# Patient Record
Sex: FEMALE | Race: BLACK OR AFRICAN AMERICAN | NOT HISPANIC OR LATINO | Employment: UNEMPLOYED | URBAN - METROPOLITAN AREA
[De-identification: names, ages, dates, MRNs, and addresses within clinical notes are randomized per-mention and may not be internally consistent; named-entity substitution may affect disease eponyms.]

---

## 2017-01-24 RX ORDER — ROSUVASTATIN CALCIUM 40 MG/1
TABLET, FILM COATED ORAL
Qty: 90 TABLET | Refills: 0 | Status: SHIPPED | OUTPATIENT
Start: 2017-01-24 | End: 2017-05-24 | Stop reason: SDUPTHER

## 2017-01-25 ENCOUNTER — TELEPHONE (OUTPATIENT)
Dept: INTERNAL MEDICINE | Facility: CLINIC | Age: 57
End: 2017-01-25

## 2017-01-25 DIAGNOSIS — Z00.00 ANNUAL PHYSICAL EXAM: Primary | ICD-10-CM

## 2017-01-25 NOTE — TELEPHONE ENCOUNTER
Spoke with Ms Jes in regards to appointment today informing it's not time for her Annual to isn't due until may . Asked if she wanted to reschedule for 5 and labs prior, scheduled for 5/30 ,5/23 will mail reminder

## 2017-03-08 ENCOUNTER — PATIENT MESSAGE (OUTPATIENT)
Dept: ALLERGY | Facility: CLINIC | Age: 57
End: 2017-03-08

## 2017-03-08 RX ORDER — ERGOCALCIFEROL 1.25 MG/1
CAPSULE ORAL
Qty: 12 CAPSULE | Refills: 0 | Status: SHIPPED | OUTPATIENT
Start: 2017-03-08 | End: 2018-03-14

## 2017-03-22 ENCOUNTER — TELEPHONE (OUTPATIENT)
Dept: ALLERGY | Facility: CLINIC | Age: 57
End: 2017-03-22

## 2017-03-23 RX ORDER — ERGOCALCIFEROL 1.25 MG/1
CAPSULE ORAL
Qty: 12 CAPSULE | Refills: 0 | OUTPATIENT
Start: 2017-03-23

## 2017-04-20 ENCOUNTER — TELEPHONE (OUTPATIENT)
Dept: SURGERY | Facility: CLINIC | Age: 57
End: 2017-04-20

## 2017-04-20 NOTE — TELEPHONE ENCOUNTER
Spoke with patient. Informed her she will need her nxt colonoscopy in 2020. She requested  To have her husbands ordered. Case request entered for him.

## 2017-05-23 ENCOUNTER — LAB VISIT (OUTPATIENT)
Dept: LAB | Facility: OTHER | Age: 57
End: 2017-05-23
Attending: FAMILY MEDICINE
Payer: COMMERCIAL

## 2017-05-23 DIAGNOSIS — Z00.00 ANNUAL PHYSICAL EXAM: ICD-10-CM

## 2017-05-23 LAB
25(OH)D3+25(OH)D2 SERPL-MCNC: 25 NG/ML
ALBUMIN SERPL BCP-MCNC: 3.9 G/DL
ALP SERPL-CCNC: 113 U/L
ALT SERPL W/O P-5'-P-CCNC: 28 U/L
ANION GAP SERPL CALC-SCNC: 11 MMOL/L
AST SERPL-CCNC: 22 U/L
BASOPHILS # BLD AUTO: 0 K/UL
BASOPHILS NFR BLD: 0 %
BILIRUB SERPL-MCNC: 0.5 MG/DL
BUN SERPL-MCNC: 14 MG/DL
CALCIUM SERPL-MCNC: 9.3 MG/DL
CHLORIDE SERPL-SCNC: 107 MMOL/L
CHOLEST/HDLC SERPL: 2.6 {RATIO}
CO2 SERPL-SCNC: 23 MMOL/L
CREAT SERPL-MCNC: 0.8 MG/DL
DIFFERENTIAL METHOD: NORMAL
EOSINOPHIL # BLD AUTO: 0 K/UL
EOSINOPHIL NFR BLD: 0 %
ERYTHROCYTE [DISTWIDTH] IN BLOOD BY AUTOMATED COUNT: 14.1 %
EST. GFR  (AFRICAN AMERICAN): >60 ML/MIN/1.73 M^2
EST. GFR  (NON AFRICAN AMERICAN): >60 ML/MIN/1.73 M^2
ESTIMATED AVG GLUCOSE: 131 MG/DL
GLUCOSE SERPL-MCNC: 90 MG/DL
HBA1C MFR BLD HPLC: 6.2 %
HCT VFR BLD AUTO: 45.2 %
HDL/CHOLESTEROL RATIO: 38 %
HDLC SERPL-MCNC: 137 MG/DL
HDLC SERPL-MCNC: 52 MG/DL
HGB BLD-MCNC: 14.9 G/DL
LDLC SERPL CALC-MCNC: 71.8 MG/DL
LYMPHOCYTES # BLD AUTO: 2.5 K/UL
LYMPHOCYTES NFR BLD: 42.8 %
MCH RBC QN AUTO: 29.1 PG
MCHC RBC AUTO-ENTMCNC: 33 %
MCV RBC AUTO: 88 FL
MONOCYTES # BLD AUTO: 0.5 K/UL
MONOCYTES NFR BLD: 9.2 %
NEUTROPHILS # BLD AUTO: 2.8 K/UL
NEUTROPHILS NFR BLD: 47.8 %
NONHDLC SERPL-MCNC: 85 MG/DL
PLATELET # BLD AUTO: 211 K/UL
PMV BLD AUTO: 10.9 FL
POTASSIUM SERPL-SCNC: 4.2 MMOL/L
PROT SERPL-MCNC: 7.3 G/DL
RBC # BLD AUTO: 5.12 M/UL
SODIUM SERPL-SCNC: 141 MMOL/L
TRIGL SERPL-MCNC: 66 MG/DL
TSH SERPL DL<=0.005 MIU/L-ACNC: 0.86 UIU/ML
WBC # BLD AUTO: 5.89 K/UL

## 2017-05-23 PROCEDURE — 80053 COMPREHEN METABOLIC PANEL: CPT

## 2017-05-23 PROCEDURE — 85025 COMPLETE CBC W/AUTO DIFF WBC: CPT

## 2017-05-23 PROCEDURE — 84443 ASSAY THYROID STIM HORMONE: CPT

## 2017-05-23 PROCEDURE — 80061 LIPID PANEL: CPT

## 2017-05-23 PROCEDURE — 36415 COLL VENOUS BLD VENIPUNCTURE: CPT

## 2017-05-23 PROCEDURE — 83036 HEMOGLOBIN GLYCOSYLATED A1C: CPT

## 2017-05-23 PROCEDURE — 82306 VITAMIN D 25 HYDROXY: CPT

## 2017-05-23 RX ORDER — ROSUVASTATIN CALCIUM 40 MG/1
TABLET, FILM COATED ORAL
Qty: 90 TABLET | Refills: 0 | OUTPATIENT
Start: 2017-05-23

## 2017-05-24 RX ORDER — ROSUVASTATIN CALCIUM 40 MG/1
40 TABLET, COATED ORAL NIGHTLY
Qty: 90 TABLET | Refills: 0 | Status: SHIPPED | OUTPATIENT
Start: 2017-05-24 | End: 2017-08-22 | Stop reason: SDUPTHER

## 2017-05-24 NOTE — TELEPHONE ENCOUNTER
----- Message from Brianna Kenny sent at 5/24/2017  9:59 AM CDT -----  Contact: pt   X_  1st Request  _  2nd Request  _  3rd Request    Who:HARPREET BAIG [[id number]]    Why: Patient states she is completely out of her medication and would like to get a refill called in today (CRESTOR 40 mg Tab) please call into [facility] [id number] - [place] [address] AT [address]    What Number to Call Back: [phone]    When to Expect a call back: (Before the end of the day)   -- if call after 3:00 call back will be tomorrow.

## 2017-05-30 ENCOUNTER — TELEPHONE (OUTPATIENT)
Dept: INTERNAL MEDICINE | Facility: CLINIC | Age: 57
End: 2017-05-30

## 2017-05-30 ENCOUNTER — OFFICE VISIT (OUTPATIENT)
Dept: INTERNAL MEDICINE | Facility: CLINIC | Age: 57
End: 2017-05-30
Attending: FAMILY MEDICINE
Payer: COMMERCIAL

## 2017-05-30 VITALS
DIASTOLIC BLOOD PRESSURE: 84 MMHG | HEART RATE: 58 BPM | BODY MASS INDEX: 39.01 KG/M2 | SYSTOLIC BLOOD PRESSURE: 142 MMHG | HEIGHT: 65 IN | WEIGHT: 234.13 LBS

## 2017-05-30 DIAGNOSIS — Z12.31 ENCOUNTER FOR SCREENING MAMMOGRAM FOR BREAST CANCER: ICD-10-CM

## 2017-05-30 DIAGNOSIS — R73.09 ELEVATED HEMOGLOBIN A1C: ICD-10-CM

## 2017-05-30 DIAGNOSIS — Z00.00 ANNUAL PHYSICAL EXAM: Primary | ICD-10-CM

## 2017-05-30 DIAGNOSIS — Z12.4 PAP SMEAR FOR CERVICAL CANCER SCREENING: ICD-10-CM

## 2017-05-30 PROCEDURE — 99396 PREV VISIT EST AGE 40-64: CPT | Mod: S$GLB,,, | Performed by: FAMILY MEDICINE

## 2017-05-30 PROCEDURE — 99999 PR PBB SHADOW E&M-EST. PATIENT-LVL V: CPT | Mod: PBBFAC,,, | Performed by: FAMILY MEDICINE

## 2017-05-30 RX ORDER — METOPROLOL SUCCINATE 25 MG/1
TABLET, EXTENDED RELEASE ORAL
Qty: 90 TABLET | Refills: 0 | Status: SHIPPED | OUTPATIENT
Start: 2017-05-30 | End: 2018-03-21 | Stop reason: SDUPTHER

## 2017-05-30 RX ORDER — HYDROCORTISONE 25 MG/G
CREAM TOPICAL
Refills: 2 | COMMUNITY
Start: 2017-05-25 | End: 2018-03-14

## 2017-05-30 RX ORDER — KETOCONAZOLE 20 MG/G
CREAM TOPICAL
Refills: 2 | COMMUNITY
Start: 2017-05-25 | End: 2018-03-14

## 2017-05-30 RX ORDER — CLOTRIMAZOLE AND BETAMETHASONE DIPROPIONATE 10; .64 MG/G; MG/G
CREAM TOPICAL
Refills: 2 | COMMUNITY
Start: 2017-05-25 | End: 2018-03-14

## 2017-05-30 NOTE — PROGRESS NOTES
Subjective:       Patient ID: Farideh Andre is a 57 y.o. female.    Chief Complaint: Annual Exam    Pt presents today for annual exam. Feels great. No complaints  Sees Dr. Ashraf for GYN. Needs mammo      Review of Systems   Constitutional: Negative for activity change, appetite change, chills, fatigue and fever.   HENT: Negative for congestion, ear pain, sinus pressure, sore throat and trouble swallowing.    Eyes: Negative for photophobia, pain and visual disturbance.   Respiratory: Negative for apnea, cough, chest tightness, shortness of breath and wheezing.    Cardiovascular: Negative for chest pain, palpitations and leg swelling.   Gastrointestinal: Negative for abdominal distention, abdominal pain, constipation, diarrhea, nausea and vomiting.   Genitourinary: Negative.    Musculoskeletal: Negative for back pain, joint swelling and neck pain.   Skin: Negative.    Neurological: Negative for dizziness, tremors, weakness, numbness and headaches.   Psychiatric/Behavioral: Negative for behavioral problems, decreased concentration and sleep disturbance. The patient is not nervous/anxious.        Objective:      Physical Exam   Constitutional: She is oriented to person, place, and time. She appears well-developed and well-nourished.   HENT:   Head: Normocephalic and atraumatic.   Right Ear: External ear normal.   Left Ear: External ear normal.   Nose: Nose normal.   Mouth/Throat: Oropharynx is clear and moist. No oropharyngeal exudate.   Eyes: EOM are normal. Pupils are equal, round, and reactive to light.   Neck: Normal range of motion. Neck supple. No thyromegaly present.   Cardiovascular: Normal rate, regular rhythm, normal heart sounds and intact distal pulses.    No murmur heard.  Pulmonary/Chest: Effort normal and breath sounds normal. No respiratory distress.   Abdominal: Soft. Bowel sounds are normal. She exhibits no distension and no mass. There is no tenderness. There is no rebound and no guarding.    Musculoskeletal: Normal range of motion. She exhibits no edema or tenderness.   Lymphadenopathy:     She has no cervical adenopathy.   Neurological: She is alert and oriented to person, place, and time. She has normal reflexes.   Skin: Skin is warm and dry. No erythema.   Psychiatric: She has a normal mood and affect. Her behavior is normal. Judgment and thought content normal.       Assessment:       1. Pap smear for cervical cancer screening    2. Encounter for screening mammogram for breast cancer    3. Elevated hemoglobin A1c        Plan:       MONICA rodas  Will see Dr. Ashraf for GYN  mammo ordered  Pt advised to watch diet and will repeat a1c for diabetes in  3-6 mos  Pt amenable  Pap smear for cervical cancer screening    Encounter for screening mammogram for breast cancer    Elevated hemoglobin A1c  -     Hemoglobin A1c; Future; Expected date: 11/30/2017    Other orders  -     Cancel: Ambulatory referral to Obstetrics / Gynecology  -     Cancel: Mammo Digital Screening Bilat with CAD; Future; Expected date: 01/25/2017      RTC q 6 mos or prn

## 2017-06-07 ENCOUNTER — PATIENT OUTREACH (OUTPATIENT)
Dept: ADMINISTRATIVE | Facility: HOSPITAL | Age: 57
End: 2017-06-07

## 2017-06-20 ENCOUNTER — OFFICE VISIT (OUTPATIENT)
Dept: INTERNAL MEDICINE | Facility: CLINIC | Age: 57
End: 2017-06-20
Attending: FAMILY MEDICINE
Payer: COMMERCIAL

## 2017-06-20 VITALS
BODY MASS INDEX: 38.89 KG/M2 | HEART RATE: 64 BPM | DIASTOLIC BLOOD PRESSURE: 82 MMHG | SYSTOLIC BLOOD PRESSURE: 134 MMHG | HEIGHT: 65 IN | WEIGHT: 233.44 LBS

## 2017-06-20 DIAGNOSIS — I10 ESSENTIAL HYPERTENSION: Primary | ICD-10-CM

## 2017-06-20 PROCEDURE — 99999 PR PBB SHADOW E&M-EST. PATIENT-LVL III: CPT | Mod: PBBFAC,,, | Performed by: FAMILY MEDICINE

## 2017-06-20 PROCEDURE — 99213 OFFICE O/P EST LOW 20 MIN: CPT | Mod: S$GLB,,, | Performed by: FAMILY MEDICINE

## 2017-08-22 RX ORDER — ROSUVASTATIN CALCIUM 40 MG/1
TABLET, FILM COATED ORAL
Qty: 90 TABLET | Refills: 0 | Status: SHIPPED | OUTPATIENT
Start: 2017-08-22 | End: 2017-12-11 | Stop reason: SDUPTHER

## 2017-08-30 ENCOUNTER — LAB VISIT (OUTPATIENT)
Dept: LAB | Facility: OTHER | Age: 57
End: 2017-08-30
Attending: FAMILY MEDICINE
Payer: COMMERCIAL

## 2017-08-30 DIAGNOSIS — R73.09 ELEVATED HEMOGLOBIN A1C: ICD-10-CM

## 2017-08-30 PROCEDURE — 83036 HEMOGLOBIN GLYCOSYLATED A1C: CPT

## 2017-08-30 PROCEDURE — 36415 COLL VENOUS BLD VENIPUNCTURE: CPT

## 2017-08-31 LAB
ESTIMATED AVG GLUCOSE: 117 MG/DL
HBA1C MFR BLD HPLC: 5.7 %

## 2017-09-01 RX ORDER — METOPROLOL SUCCINATE 25 MG/1
TABLET, EXTENDED RELEASE ORAL
Qty: 90 TABLET | Refills: 0 | Status: SHIPPED | OUTPATIENT
Start: 2017-09-01 | End: 2018-03-14

## 2017-12-10 RX ORDER — METOPROLOL SUCCINATE 25 MG/1
TABLET, EXTENDED RELEASE ORAL
Qty: 90 TABLET | Refills: 0 | OUTPATIENT
Start: 2017-12-10

## 2017-12-11 RX ORDER — ROSUVASTATIN CALCIUM 40 MG/1
40 TABLET, COATED ORAL NIGHTLY
Qty: 90 TABLET | Refills: 0 | Status: SHIPPED | OUTPATIENT
Start: 2017-12-11 | End: 2018-01-10 | Stop reason: SDUPTHER

## 2017-12-11 RX ORDER — METOPROLOL SUCCINATE 25 MG/1
25 TABLET, EXTENDED RELEASE ORAL DAILY
Qty: 90 TABLET | Refills: 0 | Status: SHIPPED | OUTPATIENT
Start: 2017-12-11 | End: 2018-03-14

## 2017-12-11 NOTE — TELEPHONE ENCOUNTER
----- Message from Virginia Rod sent at 12/11/2017  9:30 AM CST -----  Contact: Patient herself  _  1st Request  X  2nd Request  _  3rd Request    Who: Farideh Andre (mrn# [id number])    Medication # 1 CRESTOR  40 mg    Medication # 2  METOPROLOL SUCCINATE  XL 25 mg    Patient's Number to Call Back: [phone]    Preferred Pharmacy:  [facility] # [id number] - [place] - [address] [facility] [phone]  /  Fax# [phone]

## 2018-01-10 DIAGNOSIS — E78.5 DYSLIPIDEMIA: Primary | ICD-10-CM

## 2018-01-10 RX ORDER — ROSUVASTATIN CALCIUM 40 MG/1
40 TABLET, COATED ORAL NIGHTLY
Qty: 90 TABLET | Refills: 0 | Status: SHIPPED | OUTPATIENT
Start: 2018-01-10 | End: 2018-02-16 | Stop reason: SDUPTHER

## 2018-02-16 DIAGNOSIS — E78.5 DYSLIPIDEMIA: Primary | ICD-10-CM

## 2018-02-16 RX ORDER — ROSUVASTATIN CALCIUM 40 MG/1
40 TABLET, FILM COATED ORAL NIGHTLY
Qty: 90 TABLET | Refills: 3 | Status: SHIPPED | OUTPATIENT
Start: 2018-02-16 | End: 2019-03-15 | Stop reason: SDUPTHER

## 2018-02-16 NOTE — TELEPHONE ENCOUNTER
Patient requesting prescription for Crestor / rosuvastatin to be sent to pharmacy requesting a 'Brand name'   Tatyana Huber RN

## 2018-02-16 NOTE — TELEPHONE ENCOUNTER
----- Message from Laura Gu sent at 2/16/2018 12:01 PM CST -----  Contact: HARPREET BAIG [[id number]]  x_  1st Request  _  2nd Request  _  3rd Request        Who: HARPREET BAIG [[id number]]    Why: Requesting a call back in regards to her medication she have information about it, please call her.     What Number to Call Back: [phone]    When to Expect a call back: (Within 24 hours)    Please return the call at earliest convenience. Thanks!

## 2018-02-22 ENCOUNTER — TELEPHONE (OUTPATIENT)
Dept: INTERNAL MEDICINE | Facility: CLINIC | Age: 58
End: 2018-02-22

## 2018-02-22 NOTE — TELEPHONE ENCOUNTER
Spoke with the pt in regards to her message regarding PA for the Crestor for the name brand only. Pt states that her cardiologist changed to name brand. I informed the pt that Dr. Multani is out until 2/27. Pt states that she will ask the cardiologist when she see him next.

## 2018-02-22 NOTE — TELEPHONE ENCOUNTER
----- Message from Ирина Graham sent at 2/22/2018 11:04 AM CST -----  Contact: pt  _  1st Request  _  2nd Request  _  3rd Request        Who: pt    Why: Requesting a call back in regards to her prescription confusion. Please call pt     What Number to Call Back:[phone]    When to Expect a call back: (Within 24 hours)    Please return the call at earliest convenience. Thanks!

## 2018-02-22 NOTE — TELEPHONE ENCOUNTER
----- Message from Marya Scott sent at 2/22/2018  7:43 AM CST -----  Contact: HARPREET BAIG [[id number]]  x_  1st Request  _  2nd Request  _  3rd Request      Who: HARPREET BAIG [[id number]]    Why: patient states she would like a call back in regards to a requested PA on Rx CRESTOR 40 mg Tab    What Number to Call Back: [phone]    When to Expect a call back: (Before the end of the day)   -- if call after 3:00 call back will be tomorrow.

## 2018-02-22 NOTE — TELEPHONE ENCOUNTER
----- Message from Brianna Cox sent at 2/22/2018 11:18 AM CST -----  Contact: Ekaterina  X_  1st Request  _  2nd Request  _  3rd Request    Who:Tony with [facility]     Why: Tony with [facility] states she would like a call back to verify it is ok to give the patient the generic form of CRESTOR 40 mg Tab... Please contact to further discuss and advise     What Number to Call Back: [phone]    When to Expect a call back: (Before the end of the day)   -- if call after 3:00 call back will be tomorrow.

## 2018-02-22 NOTE — TELEPHONE ENCOUNTER
Spoke with the pt who is requesting her Crestor be called into the pharm for the generic because the name brand needs a PA and she doesn't want to wait for that because she is going out of town. Conversation was understood and it ended.

## 2018-02-28 RX ORDER — ROSUVASTATIN CALCIUM 40 MG/1
40 TABLET, COATED ORAL NIGHTLY
Qty: 90 TABLET | Refills: 3 | Status: SHIPPED | OUTPATIENT
Start: 2018-02-28 | End: 2019-02-28

## 2018-02-28 NOTE — TELEPHONE ENCOUNTER
Pt was prescribed Crestor 40 mg. This is not covered by patients insurance. Pharmacy is requesting new Rx for generic Crestor.

## 2018-03-14 ENCOUNTER — PATIENT MESSAGE (OUTPATIENT)
Dept: INTERNAL MEDICINE | Facility: CLINIC | Age: 58
End: 2018-03-14

## 2018-03-14 ENCOUNTER — LAB VISIT (OUTPATIENT)
Dept: LAB | Facility: OTHER | Age: 58
End: 2018-03-14
Attending: FAMILY MEDICINE
Payer: COMMERCIAL

## 2018-03-14 ENCOUNTER — OFFICE VISIT (OUTPATIENT)
Dept: INTERNAL MEDICINE | Facility: CLINIC | Age: 58
End: 2018-03-14
Attending: FAMILY MEDICINE
Payer: COMMERCIAL

## 2018-03-14 VITALS
BODY MASS INDEX: 38.24 KG/M2 | HEIGHT: 65 IN | HEART RATE: 63 BPM | WEIGHT: 229.5 LBS | SYSTOLIC BLOOD PRESSURE: 132 MMHG | DIASTOLIC BLOOD PRESSURE: 78 MMHG

## 2018-03-14 DIAGNOSIS — Z00.00 ANNUAL PHYSICAL EXAM: Primary | ICD-10-CM

## 2018-03-14 DIAGNOSIS — E78.5 DYSLIPIDEMIA: Chronic | ICD-10-CM

## 2018-03-14 DIAGNOSIS — L50.9 URTICARIA: ICD-10-CM

## 2018-03-14 DIAGNOSIS — Z00.00 ANNUAL PHYSICAL EXAM: ICD-10-CM

## 2018-03-14 DIAGNOSIS — E55.9 VITAMIN D DEFICIENCY: ICD-10-CM

## 2018-03-14 DIAGNOSIS — I10 ESSENTIAL HYPERTENSION: ICD-10-CM

## 2018-03-14 LAB
25(OH)D3+25(OH)D2 SERPL-MCNC: 22 NG/ML
ALBUMIN SERPL BCP-MCNC: 3.9 G/DL
ALP SERPL-CCNC: 91 U/L
ALT SERPL W/O P-5'-P-CCNC: 33 U/L
ANION GAP SERPL CALC-SCNC: 10 MMOL/L
AST SERPL-CCNC: 23 U/L
BASOPHILS # BLD AUTO: 0 K/UL
BASOPHILS NFR BLD: 0 %
BILIRUB SERPL-MCNC: 0.5 MG/DL
BUN SERPL-MCNC: 9 MG/DL
CALCIUM SERPL-MCNC: 9.7 MG/DL
CHLORIDE SERPL-SCNC: 106 MMOL/L
CHOLEST SERPL-MCNC: 143 MG/DL
CHOLEST/HDLC SERPL: 2.3 {RATIO}
CO2 SERPL-SCNC: 26 MMOL/L
CREAT SERPL-MCNC: 0.8 MG/DL
DIFFERENTIAL METHOD: NORMAL
EOSINOPHIL # BLD AUTO: 0 K/UL
EOSINOPHIL NFR BLD: 0 %
ERYTHROCYTE [DISTWIDTH] IN BLOOD BY AUTOMATED COUNT: 14 %
EST. GFR  (AFRICAN AMERICAN): >60 ML/MIN/1.73 M^2
EST. GFR  (NON AFRICAN AMERICAN): >60 ML/MIN/1.73 M^2
ESTIMATED AVG GLUCOSE: 117 MG/DL
GLUCOSE SERPL-MCNC: 90 MG/DL
HBA1C MFR BLD HPLC: 5.7 %
HCT VFR BLD AUTO: 48 %
HDLC SERPL-MCNC: 63 MG/DL
HDLC SERPL: 44.1 %
HGB BLD-MCNC: 15.4 G/DL
LDLC SERPL CALC-MCNC: 70 MG/DL
LYMPHOCYTES # BLD AUTO: 2.3 K/UL
LYMPHOCYTES NFR BLD: 25.5 %
MCH RBC QN AUTO: 29 PG
MCHC RBC AUTO-ENTMCNC: 32.1 G/DL
MCV RBC AUTO: 90 FL
MONOCYTES # BLD AUTO: 0.8 K/UL
MONOCYTES NFR BLD: 8.6 %
NEUTROPHILS # BLD AUTO: 6 K/UL
NEUTROPHILS NFR BLD: 65.7 %
NONHDLC SERPL-MCNC: 80 MG/DL
PLATELET # BLD AUTO: 210 K/UL
PMV BLD AUTO: 10.8 FL
POTASSIUM SERPL-SCNC: 3.9 MMOL/L
PROT SERPL-MCNC: 7.6 G/DL
RBC # BLD AUTO: 5.31 M/UL
SODIUM SERPL-SCNC: 142 MMOL/L
TRIGL SERPL-MCNC: 50 MG/DL
TSH SERPL DL<=0.005 MIU/L-ACNC: 0.76 UIU/ML
WBC # BLD AUTO: 9.11 K/UL

## 2018-03-14 PROCEDURE — 80053 COMPREHEN METABOLIC PANEL: CPT

## 2018-03-14 PROCEDURE — 83036 HEMOGLOBIN GLYCOSYLATED A1C: CPT

## 2018-03-14 PROCEDURE — 99999 PR PBB SHADOW E&M-EST. PATIENT-LVL III: CPT | Mod: PBBFAC,,, | Performed by: FAMILY MEDICINE

## 2018-03-14 PROCEDURE — 84443 ASSAY THYROID STIM HORMONE: CPT

## 2018-03-14 PROCEDURE — 85025 COMPLETE CBC W/AUTO DIFF WBC: CPT

## 2018-03-14 PROCEDURE — 82306 VITAMIN D 25 HYDROXY: CPT

## 2018-03-14 PROCEDURE — 36415 COLL VENOUS BLD VENIPUNCTURE: CPT

## 2018-03-14 PROCEDURE — 80061 LIPID PANEL: CPT

## 2018-03-14 PROCEDURE — 99396 PREV VISIT EST AGE 40-64: CPT | Mod: S$GLB,,, | Performed by: FAMILY MEDICINE

## 2018-03-14 RX ORDER — EPINEPHRINE 0.3 MG/.3ML
INJECTION SUBCUTANEOUS
COMMUNITY
Start: 2015-08-13 | End: 2021-07-14 | Stop reason: SDUPTHER

## 2018-03-14 RX ORDER — MINERAL OIL
1 ENEMA (ML) RECTAL DAILY
COMMUNITY
End: 2018-04-03

## 2018-03-14 RX ORDER — HYDROCODONE BITARTRATE AND ACETAMINOPHEN 5; 325 MG/1; MG/1
TABLET ORAL
COMMUNITY
Start: 2018-02-22 | End: 2021-01-27

## 2018-03-14 NOTE — PROGRESS NOTES
Subjective:       Patient ID: Farideh Andre is a 58 y.o. female.    Chief Complaint: Annual Exam    Pt presents today for annual exam. Feels tired bc she is caring for her daughter in pre term labor as well as elderly family members. Pt does worry about a tingle that she has around her right eye and upper lip. Pt has not had shingles vaccine  Sees Dr. Ashraf for GYN. Needs mammo--DIS      Review of Systems   Constitutional: Negative for activity change, appetite change, chills, fatigue and fever.   HENT: Negative for congestion, ear pain, sinus pressure, sore throat and trouble swallowing.    Eyes: Negative for photophobia, pain and visual disturbance.   Respiratory: Negative for apnea, cough, chest tightness, shortness of breath and wheezing.    Cardiovascular: Negative for chest pain, palpitations and leg swelling.   Gastrointestinal: Negative for abdominal distention, abdominal pain, constipation, diarrhea, nausea and vomiting.   Genitourinary: Negative.    Musculoskeletal: Negative for back pain, joint swelling and neck pain.   Skin: Negative.    Neurological: Negative for dizziness, tremors, weakness, numbness and headaches.   Psychiatric/Behavioral: Negative for behavioral problems, decreased concentration and sleep disturbance. The patient is not nervous/anxious.        Objective:      Physical Exam   Constitutional: She is oriented to person, place, and time. She appears well-developed and well-nourished.   HENT:   Head: Normocephalic and atraumatic.   Right Ear: External ear normal.   Left Ear: External ear normal.   Nose: Nose normal.   Mouth/Throat: Oropharynx is clear and moist. No oropharyngeal exudate.   Eyes: EOM are normal. Pupils are equal, round, and reactive to light.   Neck: Normal range of motion. Neck supple. No thyromegaly present.   Cardiovascular: Normal rate, regular rhythm, normal heart sounds and intact distal pulses.    No murmur heard.  Pulmonary/Chest: Effort normal and breath  sounds normal. No respiratory distress.   Abdominal: Soft. Bowel sounds are normal. She exhibits no distension and no mass. There is no tenderness. There is no rebound and no guarding.   Musculoskeletal: Normal range of motion. She exhibits no edema or tenderness.   Lymphadenopathy:     She has no cervical adenopathy.   Neurological: She is alert and oriented to person, place, and time. She has normal reflexes. She displays normal reflexes. No sensory deficit. She exhibits normal muscle tone. Coordination normal.   Skin: Skin is warm and dry. No erythema.   Psychiatric: She has a normal mood and affect. Her behavior is normal. Judgment and thought content normal.       Assessment:       1. Annual physical exam        Plan:       PE wnl  HTN controlled on meds  Vit d def: need lab      Will see Dr. Ashraf for GYN  mammo ordered  Pt advised to watch diet and exercise  Unclear if this migth be shingles neuropathy. Will observe. If pt can afford it, would advise her to get shingles vaccine  Pt amenable  Annual physical exam  -     CBC auto differential; Future; Expected date: 03/14/2018  -     Comprehensive metabolic panel; Future; Expected date: 03/14/2018  -     TSH; Future; Expected date: 03/14/2018  -     Lipid panel; Future; Expected date: 03/14/2018  -     VITAMIN D; Future; Expected date: 03/14/2018  -     Hemoglobin A1c; Future; Expected date: 03/14/2018      RTC q 6 mos or prn

## 2018-03-21 DIAGNOSIS — I10 HYPERTENSION, UNSPECIFIED TYPE: Primary | ICD-10-CM

## 2018-03-21 RX ORDER — METOPROLOL SUCCINATE 25 MG/1
25 TABLET, EXTENDED RELEASE ORAL DAILY
Qty: 90 TABLET | Refills: 1 | Status: SHIPPED | OUTPATIENT
Start: 2018-03-21 | End: 2018-09-24 | Stop reason: SDUPTHER

## 2018-03-21 NOTE — TELEPHONE ENCOUNTER
----- Message from Erin Velasco sent at 3/21/2018 11:31 AM CDT -----  Contact: Patient herself  _  1st Request  X  2nd Request  _  3rd Request    Please refill the medication(s) listed below. Please call the patient when the prescription(s) is ready for  at the phone number [phone] .    Medication #1  CRESTOR  40 mg    Medication #2  TOPROL- XL 25 mg      Preferred Pharmacy:  [facility] [place] [address] [address]  Ph# [phone]  /  Fax# [phone]

## 2018-03-22 RX ORDER — ROSUVASTATIN CALCIUM 40 MG/1
TABLET, COATED ORAL
Qty: 30 TABLET | Refills: 0 | OUTPATIENT
Start: 2018-03-22

## 2018-04-03 ENCOUNTER — HOSPITAL ENCOUNTER (OUTPATIENT)
Dept: CARDIOLOGY | Facility: CLINIC | Age: 58
Discharge: HOME | End: 2018-04-03
Payer: COMMERCIAL

## 2018-04-03 ENCOUNTER — OFFICE VISIT (OUTPATIENT)
Dept: CARDIOLOGY | Facility: CLINIC | Age: 58
End: 2018-04-03
Payer: COMMERCIAL

## 2018-04-03 VITALS
SYSTOLIC BLOOD PRESSURE: 141 MMHG | OXYGEN SATURATION: 98 % | DIASTOLIC BLOOD PRESSURE: 70 MMHG | BODY MASS INDEX: 38.02 KG/M2 | HEIGHT: 65 IN | WEIGHT: 228.19 LBS | HEART RATE: 57 BPM

## 2018-04-03 DIAGNOSIS — I25.10 CORONARY ARTERY DISEASE INVOLVING NATIVE CORONARY ARTERY WITHOUT ANGINA PECTORIS, UNSPECIFIED WHETHER NATIVE OR TRANSPLANTED HEART: Chronic | ICD-10-CM

## 2018-04-03 DIAGNOSIS — I10 ESSENTIAL HYPERTENSION: ICD-10-CM

## 2018-04-03 DIAGNOSIS — I10 HYPERTENSION, UNSPECIFIED TYPE: Primary | ICD-10-CM

## 2018-04-03 DIAGNOSIS — Z98.61 POST PTCA: Chronic | ICD-10-CM

## 2018-04-03 DIAGNOSIS — Z01.810 PREOPERATIVE CARDIOVASCULAR EXAMINATION: Primary | ICD-10-CM

## 2018-04-03 DIAGNOSIS — E78.5 DYSLIPIDEMIA: Chronic | ICD-10-CM

## 2018-04-03 DIAGNOSIS — I10 HYPERTENSION, UNSPECIFIED TYPE: ICD-10-CM

## 2018-04-03 PROCEDURE — 3078F DIAST BP <80 MM HG: CPT | Mod: CPTII,S$GLB,, | Performed by: INTERNAL MEDICINE

## 2018-04-03 PROCEDURE — 93000 ELECTROCARDIOGRAM COMPLETE: CPT | Mod: S$GLB,,, | Performed by: INTERNAL MEDICINE

## 2018-04-03 PROCEDURE — 99203 OFFICE O/P NEW LOW 30 MIN: CPT | Mod: S$GLB,,, | Performed by: INTERNAL MEDICINE

## 2018-04-03 PROCEDURE — 99999 PR PBB SHADOW E&M-EST. PATIENT-LVL IV: CPT | Mod: PBBFAC,,, | Performed by: INTERNAL MEDICINE

## 2018-04-03 PROCEDURE — 3077F SYST BP >= 140 MM HG: CPT | Mod: CPTII,S$GLB,, | Performed by: INTERNAL MEDICINE

## 2018-04-03 NOTE — PROGRESS NOTES
Subjective:   Patient ID:  Farideh Andre is a 58 y.o. female who presents for  Pre-op Exam (herniated disc); Coronary Artery Disease; and Cough      HPI:   Farideh Andre presents for pre op evaluation and risk stratification for C Spine surgery. Farideh Andre has known coronary artery disease having had LAD PCI in the past. She has started exercising walking 30 minutes on a treadmill with no difficulty. Can walk a flight of stairs. Farideh Andre denies chest pain, shortness of breath, palpitations, presyncope , or syncope. Farideh Andre has hypertension with mild elevation.Farideh Andre has dyslipidemia  on high intensity statin At LDL goal.  .  Review of Systems   Constitution: Positive for weight loss. Negative for weakness, malaise/fatigue and weight gain.   Eyes: Negative for blurred vision.   Cardiovascular: Negative for chest pain, claudication, cyanosis, dyspnea on exertion, irregular heartbeat, leg swelling, near-syncope, orthopnea, palpitations, paroxysmal nocturnal dyspnea and syncope.   Respiratory: Negative for cough, shortness of breath and wheezing.    Musculoskeletal: Positive for back pain and neck pain. Negative for falls and myalgias.   Gastrointestinal: Negative for abdominal pain, heartburn, nausea and vomiting.   Genitourinary: Negative for nocturia.   Neurological: Negative for brief paralysis, dizziness, focal weakness, headaches, numbness and paresthesias.   Psychiatric/Behavioral: Negative for altered mental status.       Current Outpatient Prescriptions   Medication Sig    aspirin (ECOTRIN) 81 MG EC tablet Take 81 mg by mouth. 1 Tablet, Delayed Release (E.C.) Oral Every day    CRESTOR 40 mg Tab Take 1 tablet (40 mg total) by mouth every evening.    EPINEPHrine (EPIPEN) 0.3 mg/0.3 mL AtIn as directed    hydrocodone-acetaminophen 5-325mg (NORCO) 5-325 mg per tablet     metoprolol succinate (TOPROL-XL) 25 MG 24 hr tablet Take 1 tablet (25 mg  "total) by mouth once daily.    pantoprazole (PROTONIX) 40 MG tablet TAKE 1 TABLET(40 MG) BY MOUTH EVERY DAY    predniSONE (DELTASONE) 10 MG tablet TK 1 T PO SIX TIMES A DAY FOR 2 DAYS THEN DECREASE BY ONE PILL EVERY 2 DAYS    ranitidine (ZANTAC) 150 MG capsule Take 150 capsules by mouth.    rosuvastatin (CRESTOR) 40 MG Tab Take 1 tablet (40 mg total) by mouth every evening.    tizanidine (ZANAFLEX) 4 MG tablet Take 4 tablets by mouth continuous prn.    tramadol (ULTRAM) 50 mg tablet TK 1 T PO Q 12 HOURS PRN     No current facility-administered medications for this visit.      Objective:   Physical Exam   Constitutional: She is oriented to person, place, and time. She appears well-developed. No distress.   BP (!) 141/70 (BP Location: Left arm, Patient Position: Sitting, BP Method: Large (Automatic))   Pulse (!) 57   Ht 5' 4.5" (1.638 m)   Wt 103.5 kg (228 lb 2.8 oz)   SpO2 98%   BMI 38.56 kg/m²    HENT:   Head: Normocephalic.   Eyes: Conjunctivae are normal. Pupils are equal, round, and reactive to light. No scleral icterus.   Neck: Neck supple. No JVD present. No thyromegaly present.   Cardiovascular: Normal rate, regular rhythm, normal heart sounds and intact distal pulses.  PMI is not displaced.  Exam reveals no gallop and no friction rub.    No murmur heard.  Pulmonary/Chest: Effort normal and breath sounds normal. No respiratory distress. She has no wheezes. She has no rales.   Abdominal: Soft. She exhibits no distension. There is no splenomegaly or hepatomegaly. There is no tenderness.   Musculoskeletal: She exhibits no edema or tenderness.   Gait normal   Neurological: She is alert and oriented to person, place, and time.   Skin: Skin is warm and dry. She is not diaphoretic.   Psychiatric: She has a normal mood and affect. Her behavior is normal.       Lab Results   Component Value Date     03/14/2018    K 3.9 03/14/2018     03/14/2018    CO2 26 03/14/2018    BUN 9 03/14/2018    " CREATININE 0.8 03/14/2018    GLU 90 03/14/2018    HGBA1C 5.7 (H) 03/14/2018    AST 23 03/14/2018    ALT 33 03/14/2018    ALBUMIN 3.9 03/14/2018    PROT 7.6 03/14/2018    BILITOT 0.5 03/14/2018    WBC 9.11 03/14/2018    HGB 15.4 03/14/2018    HCT 48.0 03/14/2018    MCV 90 03/14/2018     03/14/2018    TSH 0.763 03/14/2018    CHOL 143 03/14/2018    HDL 63 03/14/2018    LDLCALC 70.0 03/14/2018    TRIG 50 03/14/2018       Assessment:     1. Coronary artery disease involving native coronary artery without angina pectoris, unspecified whether native or transplanted heart : Stable   2. Preoperative cardiovascular examination    3. Post PTCA    4. Essential hypertension : Mild elevation   5. Dyslipidemia :  on high intensity statin At LDL goal       Plan:     Farideh was seen today for pre-op exam, coronary artery disease and cough.    Diagnoses and all orders for this visit:    Preoperative cardiovascular examination  Revised cardiac risk 0.9% for significant perioperative CV events making the Patient is an acceptable cardiovascular risk for the anticipated  C Spine surgery  Coronary artery disease involving native coronary artery without angina pectoris, unspecified whether native or transplanted heart    Post PTCA    Essential hypertension  Continue current regimen  Told to monitor BP at home and call for consistent elevation high 130s-140s/  Dyslipidemia  Continue current regimen  .Mediteranian diet recommended with CHO restriction.

## 2018-04-03 NOTE — PATIENT INSTRUCTIONS
It is recommended that  blood pressure be checked 3-4 times a week and a log  maintained brought with you the next visit. Call if consistently high 130s-140s/  .Mediter[place] diet recommended and carbohydrate restriction if weight loss desired

## 2018-08-22 ENCOUNTER — TELEPHONE (OUTPATIENT)
Dept: PRIMARY CARE CLINIC | Facility: CLINIC | Age: 58
End: 2018-08-22

## 2018-08-22 NOTE — TELEPHONE ENCOUNTER
----- Message from Trini Hector sent at 8/22/2018 12:13 PM CDT -----  Contact: pt   Can the clinic reply in [facility]:No      Please refill the medication(s) listed below. Please call the patient when the prescription(s) is ready for  at the phone number [phone]    Medication #1:metoprolol succinate (TOPROL-XL) 25 MG 24 hr tablet    Medication #2:      Preferred Pharmacy: [facility] [address] [place] [address] [address] AT [address]

## 2018-09-07 ENCOUNTER — TELEPHONE (OUTPATIENT)
Dept: PRIMARY CARE CLINIC | Facility: CLINIC | Age: 58
End: 2018-09-07

## 2018-09-07 NOTE — TELEPHONE ENCOUNTER
"Spoke with pt and let her know Suhail is not in today, but I will forward this to her so she will see it when she is back in office. Patient had a verbal understanding.    ----- Message from Amelia Lima sent at 9/7/2018  1:07 PM CDT -----  Name of Who is Calling: HARPREET EDUARDO [[id number]]      What is the request in detail: Pt "would like to ask suhail a question". Declined to give details      Can the clinic reply by [facility]:   No       What Number to Call Back if not in [facility]: [phone]    "

## 2018-09-07 NOTE — TELEPHONE ENCOUNTER
Spoke with patient and let her know I will forward this to Dr. Multani.    ----- Message from Nadiya A Burroughs sent at 9/7/2018  1:14 PM CDT -----  Contact: HARPREET BAIG [[id number]]        Name of Who is Calling: HARPREET BAIG [[id number]]      What is the request in detail: Pt is calling to discuss getting her daughter scheduled with Dr. Multani as a new pt.  Pt says that Dr. Multani said she would see her.  Please contact pt to further discuss and advise.      Can the clinic reply by [facility]: No      What Number to Call Back if not in [facility]: [phone]

## 2018-09-10 NOTE — TELEPHONE ENCOUNTER
Appointment scheduled for first available Medicaid slot on 10/3 at 10:20am, per Dr. Multani. Patient notified.

## 2018-09-20 NOTE — TELEPHONE ENCOUNTER
Return the patient called but she couldn't remember what she called for. Conversation was understood and it ended.

## 2018-09-24 DIAGNOSIS — I10 HYPERTENSION, UNSPECIFIED TYPE: Primary | ICD-10-CM

## 2018-09-24 RX ORDER — METOPROLOL SUCCINATE 25 MG/1
25 TABLET, EXTENDED RELEASE ORAL DAILY
Qty: 30 TABLET | Refills: 0 | Status: SHIPPED | OUTPATIENT
Start: 2018-09-24 | End: 2019-03-27 | Stop reason: SDUPTHER

## 2018-12-15 ENCOUNTER — HOSPITAL ENCOUNTER (EMERGENCY)
Facility: HOSPITAL | Age: 58
Discharge: HOME | End: 2018-12-15
Attending: EMERGENCY MEDICINE
Payer: COMMERCIAL

## 2018-12-15 VITALS
TEMPERATURE: 99 F | HEIGHT: 65 IN | WEIGHT: 228 LBS | BODY MASS INDEX: 37.99 KG/M2 | RESPIRATION RATE: 18 BRPM | DIASTOLIC BLOOD PRESSURE: 71 MMHG | SYSTOLIC BLOOD PRESSURE: 156 MMHG | OXYGEN SATURATION: 98 % | HEART RATE: 76 BPM

## 2018-12-15 DIAGNOSIS — L03.012 PARONYCHIA OF LEFT MIDDLE FINGER: Primary | ICD-10-CM

## 2018-12-15 PROCEDURE — 25000003 PHARM REV CODE 250: Performed by: PHYSICIAN ASSISTANT

## 2018-12-15 PROCEDURE — 10060 I&D ABSCESS SIMPLE/SINGLE: CPT

## 2018-12-15 PROCEDURE — 99283 EMERGENCY DEPT VISIT LOW MDM: CPT | Mod: 25

## 2018-12-15 RX ORDER — LIDOCAINE HYDROCHLORIDE 10 MG/ML
10 INJECTION INFILTRATION; PERINEURAL
Status: COMPLETED | OUTPATIENT
Start: 2018-12-15 | End: 2018-12-15

## 2018-12-15 RX ADMIN — BACITRACIN, NEOMYCIN, POLYMYXIN B 1 EACH: 400; 3.5; 5 OINTMENT TOPICAL at 08:12

## 2018-12-15 RX ADMIN — LIDOCAINE HYDROCHLORIDE 10 ML: 10 INJECTION, SOLUTION INFILTRATION; PERINEURAL at 07:12

## 2018-12-16 NOTE — ED PROVIDER NOTES
Encounter Date: 12/15/2018       History     Chief Complaint   Patient presents with    Hand Pain     reports pulled excess skin off by fingernail yesterday and then put some mercuricom on her finger and finger swelling and throbbing today     Chief Complaint:  Finger pain  History of  Present Illness: History obtained from patient. This 58 y.o. female who has hypertension and coronary artery disease presents to the ED complaining of atraumatic left 3rd finger pain that has been gradually worsening since yesterday.  Patient reports swelling in erythema to tip of her finger.  She denies trauma, drainage, fever, chills, numbness or tingling.  No prior treatment.  No previous episodes.  Pain is 10/10 and worse with movement of her finger.            Review of patient's allergies indicates:   Allergen Reactions    Sulfa (sulfonamide antibiotics)      Hives       Past Medical History:   Diagnosis Date    Angioedema 2016    Coronary artery disease     Hypertension     Obesity     Urticaria      Past Surgical History:   Procedure Laterality Date     SECTION      COLONOSCOPY N/A 2015    Procedure: COLONOSCOPY;  Surgeon: Harris Guillen MD;  Location: [facility] ([address] [place] [address]);  Service: Endoscopy;  Laterality: N/A;    COLONOSCOPY N/A 2015    Performed by Harris Guillen MD at [facility] (4TH FLR)    s/p LAD coated  2009    s/p LAD coated stent  2010    TONSILLECTOMY       Family History   Problem Relation Age of Onset    Heart disease Mother     Diabetes Mother     Stroke Mother     Diabetes Father     Urticaria Son     Breast cancer Neg Hx     Ovarian cancer Neg Hx     Vaginal cancer Neg Hx     Endometrial cancer Neg Hx     Cervical cancer Neg Hx      Social History     Tobacco Use    Smoking status: Never Smoker    Smokeless tobacco: Never Used   Substance Use Topics    Alcohol use: No    Drug use: No     Review of Systems   Constitutional: Negative for chills  and fever.   HENT: Negative for sore throat.    Eyes: Negative for pain.   Respiratory: Negative for cough and shortness of breath.    Cardiovascular: Negative for chest pain.   Gastrointestinal: Negative for abdominal pain, diarrhea, nausea and vomiting.   Genitourinary: Negative for dysuria.   Musculoskeletal: Negative for arthralgias and back pain.        (+) right finger pain with swelling and erythema   Skin: Positive for color change. Negative for rash.   Neurological: Negative for weakness, numbness and headaches.       Physical Exam     Initial Vitals [12/15/18 1826]   BP Pulse Resp Temp SpO2   (!) 184/86 71 20 98.2 °F (36.8 °C) 99 %      MAP       --         Physical Exam    Nursing note and vitals reviewed.  Constitutional: She appears well-developed and well-nourished. No distress.   HENT:   Head: Normocephalic and atraumatic.   Eyes: EOM are normal. Pupils are equal, round, and reactive to light.   Neck: Normal range of motion. Neck supple.   Cardiovascular: Normal rate, regular rhythm and normal heart sounds. Exam reveals no gallop and no friction rub.    No murmur heard.  Pulses:       Radial pulses are 2+ on the right side, and 2+ on the left side.   Pulmonary/Chest: Breath sounds normal. No respiratory distress. She has no wheezes. She has no rhonchi. She has no rales.   Abdominal: Soft. Bowel sounds are normal. There is no tenderness. There is no rebound and no guarding.   Musculoskeletal: Normal range of motion.   Patient has intact range of motion of the fingers of the left hand.    Patient with adequate flexion, extension, abduction adduction of the left 3rd finger.  There is blister to 3rd finger at the medial aspect of the nail bed.  There is surrounding erythema and edema to this that extend beyond the DIP.   Neurological: She is alert and oriented to person, place, and time.   Skin: Skin is warm and dry.   Psychiatric: She has a normal mood and affect.         ED Course   I & D - Incision and  Drainage  Date/Time: 12/16/2018 12:12 AM  Performed by: Dennis Lim PA-C  Authorized by: Christine Denton MD   Consent Done: Not Needed  Indications for incision and drainage: paronychia.  Body area: upper extremity  Location details: left long finger  Anesthesia: digital block    Anesthesia:  Local Anesthetic: lidocaine 1% without epinephrine  Anesthetic total: 5 mL  Scalpel size: 10  Incision type: Lifting of the cuticle.  Complexity: simple  Drainage: bloody and  pus  Drainage amount: moderate  Wound treatment: drainage and  expression of material  Packing material: none  Complications: No  Specimens: No  Implants: No  Patient tolerance: Patient tolerated the procedure well with no immediate complications        Labs Reviewed - No data to display       Imaging Results    None          Medical Decision Making:   ED Management:  This is an evaluation of a 58 y.o. female who presents to the ED for left 3rd finger pain.  Vital signs are stable.   Afebrile.  Patient is nontoxic appearing and in no acute distress. There is a blister to the left 3rd distal phalanx at the medial aspect of nail bed.  Patient has intact range of motion the left 3rd digit.  Patient is neurovascularly intact. Clinical exam consistent paronychia.  Paronychia was drained per the procedure note.  Patient was instructed to apply warm compress the wound daily to express any residual drainage. I do not feel antibiotics are warranted at this time.    Patient given return precautions and instructed to return to the emergency department for any new or worsening symptoms. Patient verbalized understanding and agreed with plan.     I discussed the case with Dr. Denton who is in agreement with my assessment and plan.                        Clinical Impression:   The encounter diagnosis was Paronychia of left middle finger.                             Dennis Lim PA-C  12/16/18 0015

## 2018-12-16 NOTE — DISCHARGE INSTRUCTIONS
Under finger under warm water and apply warm compresses to 3 times daily.  Wash with warm soap and water.  Do not soak for long periods of time.

## 2018-12-16 NOTE — ED TRIAGE NOTES
Patient arrived to ED with c/o pain to 3rd digit of left hand around the fingernail area x 1 day.  Reports that she woke up with it this morning and it has progressively gotten worse.

## 2019-03-15 ENCOUNTER — TELEPHONE (OUTPATIENT)
Dept: PRIMARY CARE CLINIC | Facility: CLINIC | Age: 59
End: 2019-03-15

## 2019-03-15 DIAGNOSIS — E78.5 DYSLIPIDEMIA: Primary | ICD-10-CM

## 2019-03-15 DIAGNOSIS — Z00.00 ANNUAL PHYSICAL EXAM: Primary | ICD-10-CM

## 2019-03-15 RX ORDER — ROSUVASTATIN CALCIUM 40 MG/1
40 TABLET, FILM COATED ORAL NIGHTLY
Qty: 30 TABLET | Refills: 0 | Status: SHIPPED | OUTPATIENT
Start: 2019-03-15 | End: 2019-03-20

## 2019-03-15 NOTE — TELEPHONE ENCOUNTER
----- Message from Toño Harrington sent at 3/15/2019 10:51 AM CDT -----  Contact: HARPREET BAIG [[id number]]  Name of Who is Calling: HARPREET BAIG [[id number]]       What is the request in detail: Pt called requesting to speak with MAGDALENE Whiting. A call mike is requested. Please advise. thanks     Can the clinic reply by [facility]: No     What Number to Call Back if not in [facility]: [phone]

## 2019-03-15 NOTE — TELEPHONE ENCOUNTER
Pt called to have labs schedules prior to his appointment    Labs appointment and follow up is scheduled.

## 2019-03-20 DIAGNOSIS — E78.5 DYSLIPIDEMIA: ICD-10-CM

## 2019-03-20 RX ORDER — ROSUVASTATIN CALCIUM 40 MG/1
40 TABLET, COATED ORAL NIGHTLY
Qty: 30 TABLET | Refills: 0 | Status: SHIPPED | OUTPATIENT
Start: 2019-03-20 | End: 2019-03-27 | Stop reason: SDUPTHER

## 2019-03-20 RX ORDER — ROSUVASTATIN CALCIUM 40 MG/1
40 TABLET, COATED ORAL NIGHTLY
COMMUNITY
End: 2019-03-20 | Stop reason: SDUPTHER

## 2019-03-20 NOTE — TELEPHONE ENCOUNTER
----- Message from Toño Harrington sent at 3/20/2019  1:24 PM CDT -----  Contact: HARPREET BAIG [[id number]]  Name of Who is Calling: HARPREET BAIG [[id number]]       What is the request in detail: Pt called regarding medication CRESTOR 40 mg Tab. Pt states that her insurer wont cover the grand brand. Pt requested that generic brand be called into pharmacy. A call back is requested. Please advise. Thanks        Can the clinic reply by [facility]: No       [facility]: [phone]-

## 2019-03-26 ENCOUNTER — PATIENT OUTREACH (OUTPATIENT)
Dept: ADMINISTRATIVE | Facility: HOSPITAL | Age: 59
End: 2019-03-26

## 2019-03-26 PROBLEM — Z01.419 WELL WOMAN EXAM WITH ROUTINE GYNECOLOGICAL EXAM: Status: ACTIVE | Noted: 2018-05-08

## 2019-03-27 ENCOUNTER — OFFICE VISIT (OUTPATIENT)
Dept: PRIMARY CARE CLINIC | Facility: CLINIC | Age: 59
End: 2019-03-27
Attending: FAMILY MEDICINE
Payer: COMMERCIAL

## 2019-03-27 VITALS
HEIGHT: 65 IN | DIASTOLIC BLOOD PRESSURE: 70 MMHG | HEART RATE: 59 BPM | WEIGHT: 219 LBS | BODY MASS INDEX: 36.49 KG/M2 | SYSTOLIC BLOOD PRESSURE: 128 MMHG

## 2019-03-27 DIAGNOSIS — E78.5 DYSLIPIDEMIA: ICD-10-CM

## 2019-03-27 DIAGNOSIS — Z12.39 SCREENING FOR BREAST CANCER: ICD-10-CM

## 2019-03-27 DIAGNOSIS — I10 HYPERTENSION, UNSPECIFIED TYPE: ICD-10-CM

## 2019-03-27 DIAGNOSIS — Z00.00 ANNUAL PHYSICAL EXAM: Primary | ICD-10-CM

## 2019-03-27 PROBLEM — Z01.419 WELL WOMAN EXAM WITH ROUTINE GYNECOLOGICAL EXAM: Status: RESOLVED | Noted: 2018-05-08 | Resolved: 2019-03-27

## 2019-03-27 PROBLEM — Z01.810 PREOPERATIVE CARDIOVASCULAR EXAMINATION: Status: RESOLVED | Noted: 2018-04-03 | Resolved: 2019-03-27

## 2019-03-27 PROCEDURE — 3074F SYST BP LT 130 MM HG: CPT | Mod: CPTII,S$GLB,, | Performed by: FAMILY MEDICINE

## 2019-03-27 PROCEDURE — 3078F PR MOST RECENT DIASTOLIC BLOOD PRESSURE < 80 MM HG: ICD-10-PCS | Mod: CPTII,S$GLB,, | Performed by: FAMILY MEDICINE

## 2019-03-27 PROCEDURE — 3078F DIAST BP <80 MM HG: CPT | Mod: CPTII,S$GLB,, | Performed by: FAMILY MEDICINE

## 2019-03-27 PROCEDURE — 3074F PR MOST RECENT SYSTOLIC BLOOD PRESSURE < 130 MM HG: ICD-10-PCS | Mod: CPTII,S$GLB,, | Performed by: FAMILY MEDICINE

## 2019-03-27 PROCEDURE — 99396 PR PREVENTIVE VISIT,EST,40-64: ICD-10-PCS | Mod: S$GLB,,, | Performed by: FAMILY MEDICINE

## 2019-03-27 PROCEDURE — 99999 PR PBB SHADOW E&M-EST. PATIENT-LVL III: ICD-10-PCS | Mod: PBBFAC,,, | Performed by: FAMILY MEDICINE

## 2019-03-27 PROCEDURE — 99396 PREV VISIT EST AGE 40-64: CPT | Mod: S$GLB,,, | Performed by: FAMILY MEDICINE

## 2019-03-27 PROCEDURE — 99999 PR PBB SHADOW E&M-EST. PATIENT-LVL III: CPT | Mod: PBBFAC,,, | Performed by: FAMILY MEDICINE

## 2019-03-27 RX ORDER — KETOCONAZOLE 20 MG/ML
SHAMPOO, SUSPENSION TOPICAL
Refills: 3 | COMMUNITY
Start: 2019-03-10 | End: 2021-01-27

## 2019-03-27 RX ORDER — METOPROLOL SUCCINATE 25 MG/1
25 TABLET, EXTENDED RELEASE ORAL DAILY
Qty: 90 TABLET | Refills: 1 | Status: SHIPPED | OUTPATIENT
Start: 2019-03-27 | End: 2020-01-28

## 2019-03-27 RX ORDER — ROSUVASTATIN CALCIUM 40 MG/1
40 TABLET, COATED ORAL NIGHTLY
Qty: 90 TABLET | Refills: 1 | Status: SHIPPED | OUTPATIENT
Start: 2019-03-27 | End: 2019-11-05 | Stop reason: SDUPTHER

## 2019-03-27 RX ORDER — CLOTRIMAZOLE AND BETAMETHASONE DIPROPIONATE 10; .64 MG/G; MG/G
CREAM TOPICAL
Refills: 3 | COMMUNITY
Start: 2019-03-10 | End: 2021-01-27

## 2019-03-27 NOTE — PROGRESS NOTES
Subjective:       Patient ID: Farideh Andre is a 59 y.o. female.    Chief Complaint: No chief complaint on file.    Pt presents today for annual exam. Feels great. No complaints  Sees Dr. Ashraf for GYN.  mammo done last week at [facility]    Review of Systems   Constitutional: Negative for activity change, appetite change, chills, fatigue, fever and unexpected weight change.   HENT: Positive for congestion and sinus pressure. Negative for ear pain, hearing loss, rhinorrhea, sore throat and trouble swallowing.    Eyes: Negative for photophobia, pain, discharge and visual disturbance.   Respiratory: Negative for apnea, cough, chest tightness, shortness of breath and wheezing.    Cardiovascular: Negative for chest pain, palpitations and leg swelling.   Gastrointestinal: Negative for abdominal distention, abdominal pain, blood in stool, constipation, diarrhea, nausea and vomiting.   Endocrine: Negative for polydipsia and polyuria.   Genitourinary: Negative.  Negative for difficulty urinating, dysuria, hematuria and menstrual problem.   Musculoskeletal: Negative for arthralgias, back pain, joint swelling and neck pain.   Skin: Negative.    Neurological: Negative for dizziness, tremors, weakness, numbness and headaches.   Psychiatric/Behavioral: Negative for behavioral problems, confusion, decreased concentration, dysphoric mood and sleep disturbance. The patient is not nervous/anxious.    All other systems reviewed and are negative.      Objective:      Physical Exam   Constitutional: She is oriented to person, place, and time. She appears well-developed and well-nourished.   HENT:   Head: Normocephalic and atraumatic.   Right Ear: External ear normal. A middle ear effusion is present.   Left Ear: External ear normal. A middle ear effusion is present.   Nose: Nose normal.   Mouth/Throat: Posterior oropharyngeal edema and posterior oropharyngeal erythema present. No oropharyngeal exudate.   Eyes: Pupils are equal,  round, and reactive to light. EOM are normal.   Neck: Normal range of motion. Neck supple. No thyromegaly present.   Cardiovascular: Normal rate, regular rhythm, normal heart sounds and intact distal pulses.   No murmur heard.  Pulmonary/Chest: Effort normal and breath sounds normal. No stridor. No respiratory distress. She has no wheezes. She has no rales. She exhibits no tenderness.   Abdominal: Soft. Bowel sounds are normal. She exhibits no distension and no mass. There is no tenderness. There is no rebound and no guarding.   Musculoskeletal: Normal range of motion. She exhibits no edema or tenderness.   Lymphadenopathy:     She has no cervical adenopathy.   Neurological: She is alert and oriented to person, place, and time. She has normal reflexes. She displays normal reflexes. No cranial nerve deficit or sensory deficit. She exhibits normal muscle tone. Coordination normal.   Skin: Skin is warm and dry. No erythema.   Psychiatric: She has a normal mood and affect. Her behavior is normal. Judgment and thought content normal.       Assessment:       1. Screening for breast cancer      annual exam  Seasonal allergies  Plan:       PE wnl  Will see Dr. Ashraf for GYN  mammo ordered  Allergies controlled per pt with OTC's      RTC q 6 mos or prn

## 2019-05-04 DIAGNOSIS — I10 HYPERTENSION, UNSPECIFIED TYPE: ICD-10-CM

## 2019-05-06 RX ORDER — METOPROLOL SUCCINATE 25 MG/1
TABLET, EXTENDED RELEASE ORAL
Qty: 90 TABLET | Refills: 0 | Status: SHIPPED | OUTPATIENT
Start: 2019-05-06 | End: 2019-11-06 | Stop reason: SDUPTHER

## 2019-06-04 ENCOUNTER — OFFICE VISIT (OUTPATIENT)
Dept: PRIMARY CARE CLINIC | Facility: CLINIC | Age: 59
End: 2019-06-04
Attending: FAMILY MEDICINE
Payer: COMMERCIAL

## 2019-06-04 ENCOUNTER — NURSE TRIAGE (OUTPATIENT)
Dept: ADMINISTRATIVE | Facility: CLINIC | Age: 59
End: 2019-06-04

## 2019-06-04 VITALS
DIASTOLIC BLOOD PRESSURE: 82 MMHG | SYSTOLIC BLOOD PRESSURE: 118 MMHG | WEIGHT: 225.5 LBS | HEART RATE: 68 BPM | OXYGEN SATURATION: 98 % | BODY MASS INDEX: 38.5 KG/M2 | HEIGHT: 64 IN

## 2019-06-04 DIAGNOSIS — H10.32 ACUTE BACTERIAL CONJUNCTIVITIS OF LEFT EYE: Primary | ICD-10-CM

## 2019-06-04 PROCEDURE — 99999 PR PBB SHADOW E&M-EST. PATIENT-LVL III: CPT | Mod: PBBFAC,,, | Performed by: FAMILY MEDICINE

## 2019-06-04 PROCEDURE — 3008F BODY MASS INDEX DOCD: CPT | Mod: CPTII,S$GLB,, | Performed by: FAMILY MEDICINE

## 2019-06-04 PROCEDURE — 99213 OFFICE O/P EST LOW 20 MIN: CPT | Mod: S$GLB,,, | Performed by: FAMILY MEDICINE

## 2019-06-04 PROCEDURE — 3079F DIAST BP 80-89 MM HG: CPT | Mod: CPTII,S$GLB,, | Performed by: FAMILY MEDICINE

## 2019-06-04 PROCEDURE — 3008F PR BODY MASS INDEX (BMI) DOCUMENTED: ICD-10-PCS | Mod: CPTII,S$GLB,, | Performed by: FAMILY MEDICINE

## 2019-06-04 PROCEDURE — 99213 PR OFFICE/OUTPT VISIT, EST, LEVL III, 20-29 MIN: ICD-10-PCS | Mod: S$GLB,,, | Performed by: FAMILY MEDICINE

## 2019-06-04 PROCEDURE — 99999 PR PBB SHADOW E&M-EST. PATIENT-LVL III: ICD-10-PCS | Mod: PBBFAC,,, | Performed by: FAMILY MEDICINE

## 2019-06-04 PROCEDURE — 3074F PR MOST RECENT SYSTOLIC BLOOD PRESSURE < 130 MM HG: ICD-10-PCS | Mod: CPTII,S$GLB,, | Performed by: FAMILY MEDICINE

## 2019-06-04 PROCEDURE — 3074F SYST BP LT 130 MM HG: CPT | Mod: CPTII,S$GLB,, | Performed by: FAMILY MEDICINE

## 2019-06-04 PROCEDURE — 3079F PR MOST RECENT DIASTOLIC BLOOD PRESSURE 80-89 MM HG: ICD-10-PCS | Mod: CPTII,S$GLB,, | Performed by: FAMILY MEDICINE

## 2019-06-04 RX ORDER — FLUTICASONE PROPIONATE 50 MCG
2 SPRAY, SUSPENSION (ML) NASAL DAILY
Qty: 1 BOTTLE | Refills: 5 | Status: SHIPPED | OUTPATIENT
Start: 2019-06-04 | End: 2020-07-08

## 2019-06-04 RX ORDER — NEOMYCIN/POLYMYXIN B/HYDROCORT 3.5-10K-1
1 SUSPENSION, DROPS(FINAL DOSAGE FORM)(ML) OPHTHALMIC (EYE) 3 TIMES DAILY
Qty: 7.5 ML | Refills: 0 | Status: SHIPPED | OUTPATIENT
Start: 2019-06-04 | End: 2019-06-09

## 2019-06-04 NOTE — TELEPHONE ENCOUNTER
Reason for Disposition   Caller has medication question only, adult not sick, and triager answers question    Protocols used: MEDICATION QUESTION CALL-A-FIORDALIZA Mendoza has her rx   Pt will f/u with pharm

## 2019-06-04 NOTE — PROGRESS NOTES
Subjective:       Patient ID: Farideh Andre is a 59 y.o. female.    Chief Complaint: Allergies (possible pink left eye) and Sore Throat    Pt presents today with 1 week h/o cough cold and congestion. Today noted that she had crusting around her left eye with discharge x 2 days. grandkids are at home with her and have been sick.  Does have h/o allergies and takes zyrtec daily. Does not use flonase.    Took OTC tylenol and zyrtec--helped dry her out a little but still feeling unwell    Denies any n/v/rashes/pos diarrhea/neg CP/SOB/HA/chills    Review of Systems   Constitutional: Negative for activity change, appetite change, chills, fatigue and fever.   HENT: Negative for congestion, ear pain, sinus pressure, sore throat and trouble swallowing.    Eyes: Positive for discharge, redness and itching. Negative for photophobia, pain and visual disturbance.   Respiratory: Negative for apnea, cough, chest tightness, shortness of breath and wheezing.    Cardiovascular: Negative for chest pain, palpitations and leg swelling.   Gastrointestinal: Negative for abdominal distention, abdominal pain, constipation, diarrhea, nausea and vomiting.   Genitourinary: Negative.    Musculoskeletal: Negative for back pain, joint swelling and neck pain.   Skin: Negative.    Neurological: Negative for dizziness, tremors, weakness, numbness and headaches.   Psychiatric/Behavioral: Negative for behavioral problems, decreased concentration and sleep disturbance. The patient is not nervous/anxious.    All other systems reviewed and are negative.      Objective:      Physical Exam   Constitutional: She is oriented to person, place, and time. She appears well-developed and well-nourished.   HENT:   Head: Normocephalic and atraumatic.   Right Ear: Hearing, tympanic membrane, external ear and ear canal normal.   Ears:    Eyes: Pupils are equal, round, and reactive to light. Conjunctivae and EOM are normal.   Neck: Normal range of motion. Neck  supple. No thyromegaly present.   Cardiovascular: Normal rate, regular rhythm, normal heart sounds and intact distal pulses.   No murmur heard.  Pulmonary/Chest: Effort normal and breath sounds normal. No respiratory distress. She has no wheezes. She has no rales. She exhibits no tenderness.   Musculoskeletal: Normal range of motion. She exhibits no edema.   Lymphadenopathy:     She has no cervical adenopathy.   Neurological: She is alert and oriented to person, place, and time.   Skin: Skin is warm. No erythema.   Psychiatric: She has a normal mood and affect. Her behavior is normal. Judgment and thought content normal.       Assessment:       conjunctivitis  Plan:       polytrim drops left eye x 5 days.  Good hand hygiene recommended  Pt did start an old abx eye drop that she had from past x 1 day. Advised pt NOT TO use old meds and suman abx without consulting physician first

## 2019-11-05 DIAGNOSIS — I10 HYPERTENSION, UNSPECIFIED TYPE: ICD-10-CM

## 2019-11-05 DIAGNOSIS — E78.5 DYSLIPIDEMIA: ICD-10-CM

## 2019-11-06 ENCOUNTER — PATIENT MESSAGE (OUTPATIENT)
Dept: PRIMARY CARE CLINIC | Facility: CLINIC | Age: 59
End: 2019-11-06

## 2019-11-06 RX ORDER — ROSUVASTATIN CALCIUM 40 MG/1
TABLET, COATED ORAL
Qty: 90 TABLET | Refills: 0 | Status: SHIPPED | OUTPATIENT
Start: 2019-11-06 | End: 2020-02-11

## 2019-11-06 RX ORDER — METOPROLOL SUCCINATE 25 MG/1
25 TABLET, EXTENDED RELEASE ORAL DAILY
Qty: 90 TABLET | Refills: 1 | Status: SHIPPED | OUTPATIENT
Start: 2019-11-06 | End: 2020-08-24 | Stop reason: SDUPTHER

## 2019-11-06 RX ORDER — ROSUVASTATIN CALCIUM 40 MG/1
40 TABLET, COATED ORAL NIGHTLY
Qty: 90 TABLET | Refills: 0 | Status: CANCELLED | OUTPATIENT
Start: 2019-11-06

## 2019-11-06 NOTE — TELEPHONE ENCOUNTER
LOV: 3/27/19  RTC 1 year    rosuvastatin (CRESTOR) 40 MG Tab 90 tablet 0 11/6/2019  No   Sig: TAKE 1 TABLET BY MOUTH EVERY EVENING   Sent to pharmacy as: rosuvastatin (CRESTOR) 40 MG Tab   Class: Normal   Order: [id number]   Date/Time Signed: 11/6/2019 07:55       E-Prescribing Status: Receipt confirmed by pharmacy (11/6/2019  7:55 AM CST)   Pharmacy     [id number] - [place] [id number] AT [address]

## 2020-01-28 DIAGNOSIS — I10 HYPERTENSION, UNSPECIFIED TYPE: ICD-10-CM

## 2020-01-28 RX ORDER — METOPROLOL SUCCINATE 25 MG/1
TABLET, EXTENDED RELEASE ORAL
Qty: 90 TABLET | Refills: 1 | Status: SHIPPED | OUTPATIENT
Start: 2020-01-28 | End: 2020-07-08

## 2020-02-11 DIAGNOSIS — E78.5 DYSLIPIDEMIA: ICD-10-CM

## 2020-02-11 RX ORDER — ROSUVASTATIN CALCIUM 40 MG/1
TABLET, COATED ORAL
Qty: 90 TABLET | Refills: 0 | Status: SHIPPED | OUTPATIENT
Start: 2020-02-11 | End: 2020-02-24 | Stop reason: SDUPTHER

## 2020-02-24 DIAGNOSIS — E78.5 DYSLIPIDEMIA: ICD-10-CM

## 2020-02-24 RX ORDER — ROSUVASTATIN CALCIUM 40 MG/1
40 TABLET, COATED ORAL NIGHTLY
Qty: 90 TABLET | Refills: 0 | Status: SHIPPED | OUTPATIENT
Start: 2020-02-24 | End: 2020-03-23

## 2020-02-24 NOTE — TELEPHONE ENCOUNTER
----- Message from Erin Velasco sent at 2/24/2020 10:18 AM CST -----  Contact: HARPREET BAIG [[id number]]      MEDICATION  REFILL  REQUEST      Please refill the medication(s) listed below. Please call the patient when the prescription(s) is ready for  at this phone number   [phone] (M)      Medication #1   rosuvastatin (CRESTOR) 40 MG Tab  //  90 DAY SUPPLY        Preferred Pharmacy:  [id number] - [place] - [address] AT [address]     [phone] (Phone)  [phone] (Fax)

## 2020-03-23 DIAGNOSIS — E78.5 DYSLIPIDEMIA: ICD-10-CM

## 2020-03-23 RX ORDER — ROSUVASTATIN CALCIUM 40 MG/1
TABLET, COATED ORAL
Qty: 90 TABLET | Refills: 0 | Status: SHIPPED | OUTPATIENT
Start: 2020-03-23 | End: 2020-06-26

## 2020-05-26 ENCOUNTER — PATIENT MESSAGE (OUTPATIENT)
Dept: PRIMARY CARE CLINIC | Facility: CLINIC | Age: 60
End: 2020-05-26

## 2020-05-26 DIAGNOSIS — E55.9 VITAMIN D DEFICIENCY: ICD-10-CM

## 2020-05-26 DIAGNOSIS — E78.5 DYSLIPIDEMIA: Primary | ICD-10-CM

## 2020-05-26 DIAGNOSIS — Z00.00 ANNUAL PHYSICAL EXAM: ICD-10-CM

## 2020-05-26 DIAGNOSIS — I10 HYPERTENSION, UNSPECIFIED TYPE: ICD-10-CM

## 2020-05-26 DIAGNOSIS — E66.9 OBESITY (BMI 30-39.9): ICD-10-CM

## 2020-05-26 NOTE — TELEPHONE ENCOUNTER
Spoke with Mrs Andre in regards to getting labs prior to there office visit July 8, 2020 . Patients are aware that  is out  Until June 1, 2020 .

## 2020-05-27 ENCOUNTER — PATIENT MESSAGE (OUTPATIENT)
Dept: PRIMARY CARE CLINIC | Facility: CLINIC | Age: 60
End: 2020-05-27

## 2020-06-12 ENCOUNTER — PATIENT OUTREACH (OUTPATIENT)
Dept: ADMINISTRATIVE | Facility: HOSPITAL | Age: 60
End: 2020-06-12

## 2020-07-01 ENCOUNTER — LAB VISIT (OUTPATIENT)
Dept: LAB | Facility: OTHER | Age: 60
End: 2020-07-01
Attending: INTERNAL MEDICINE
Payer: COMMERCIAL

## 2020-07-01 DIAGNOSIS — E78.5 DYSLIPIDEMIA: ICD-10-CM

## 2020-07-01 DIAGNOSIS — E55.9 VITAMIN D DEFICIENCY: ICD-10-CM

## 2020-07-01 DIAGNOSIS — E66.9 OBESITY (BMI 30-39.9): ICD-10-CM

## 2020-07-01 DIAGNOSIS — Z00.00 ANNUAL PHYSICAL EXAM: ICD-10-CM

## 2020-07-01 LAB
25(OH)D3+25(OH)D2 SERPL-MCNC: 33 NG/ML (ref 30–96)
ALBUMIN SERPL BCP-MCNC: 3.9 G/DL (ref 3.5–5.2)
ALP SERPL-CCNC: 105 U/L (ref 55–135)
ALT SERPL W/O P-5'-P-CCNC: 30 U/L (ref 10–44)
ANION GAP SERPL CALC-SCNC: 11 MMOL/L (ref 8–16)
AST SERPL-CCNC: 22 U/L (ref 10–40)
BASOPHILS # BLD AUTO: 0 K/UL (ref 0–0.2)
BASOPHILS NFR BLD: 0 % (ref 0–1.9)
BILIRUB SERPL-MCNC: 0.4 MG/DL (ref 0.1–1)
BUN SERPL-MCNC: 18 MG/DL (ref 6–20)
CALCIUM SERPL-MCNC: 8.8 MG/DL (ref 8.7–10.5)
CHLORIDE SERPL-SCNC: 106 MMOL/L (ref 95–110)
CHOLEST SERPL-MCNC: 130 MG/DL (ref 120–199)
CHOLEST/HDLC SERPL: 2.4 {RATIO} (ref 2–5)
CO2 SERPL-SCNC: 23 MMOL/L (ref 23–29)
CREAT SERPL-MCNC: 0.8 MG/DL (ref 0.5–1.4)
DIFFERENTIAL METHOD: NORMAL
EOSINOPHIL # BLD AUTO: 0 K/UL (ref 0–0.5)
EOSINOPHIL NFR BLD: 0 % (ref 0–8)
ERYTHROCYTE [DISTWIDTH] IN BLOOD BY AUTOMATED COUNT: 13.2 % (ref 11.5–14.5)
EST. GFR  (AFRICAN AMERICAN): >60 ML/MIN/1.73 M^2
EST. GFR  (NON AFRICAN AMERICAN): >60 ML/MIN/1.73 M^2
ESTIMATED AVG GLUCOSE: 120 MG/DL (ref 68–131)
GLUCOSE SERPL-MCNC: 83 MG/DL (ref 70–110)
HBA1C MFR BLD HPLC: 5.8 % (ref 4–5.6)
HCT VFR BLD AUTO: 45.5 % (ref 37–48.5)
HDLC SERPL-MCNC: 55 MG/DL (ref 40–75)
HDLC SERPL: 42.3 % (ref 20–50)
HGB BLD-MCNC: 15 G/DL (ref 12–16)
IMM GRANULOCYTES # BLD AUTO: 0.03 K/UL (ref 0–0.04)
IMM GRANULOCYTES NFR BLD AUTO: 0.4 % (ref 0–0.5)
LDLC SERPL CALC-MCNC: 63.6 MG/DL (ref 63–159)
LYMPHOCYTES # BLD AUTO: 3.4 K/UL (ref 1–4.8)
LYMPHOCYTES NFR BLD: 40.1 % (ref 18–48)
MCH RBC QN AUTO: 29.5 PG (ref 27–31)
MCHC RBC AUTO-ENTMCNC: 33 G/DL (ref 32–36)
MCV RBC AUTO: 89 FL (ref 82–98)
MONOCYTES # BLD AUTO: 0.7 K/UL (ref 0.3–1)
MONOCYTES NFR BLD: 8.4 % (ref 4–15)
NEUTROPHILS # BLD AUTO: 4.4 K/UL (ref 1.8–7.7)
NEUTROPHILS NFR BLD: 51.1 % (ref 38–73)
NONHDLC SERPL-MCNC: 75 MG/DL
NRBC BLD-RTO: 0 /100 WBC
PLATELET # BLD AUTO: 223 K/UL (ref 150–350)
PMV BLD AUTO: 10.8 FL (ref 9.2–12.9)
POTASSIUM SERPL-SCNC: 3.7 MMOL/L (ref 3.5–5.1)
PROT SERPL-MCNC: 7.1 G/DL (ref 6–8.4)
RBC # BLD AUTO: 5.09 M/UL (ref 4–5.4)
SODIUM SERPL-SCNC: 140 MMOL/L (ref 136–145)
TRIGL SERPL-MCNC: 57 MG/DL (ref 30–150)
TSH SERPL DL<=0.005 MIU/L-ACNC: 0.83 UIU/ML (ref 0.4–4)
WBC # BLD AUTO: 8.5 K/UL (ref 3.9–12.7)

## 2020-07-01 PROCEDURE — 80053 COMPREHEN METABOLIC PANEL: CPT

## 2020-07-01 PROCEDURE — 83036 HEMOGLOBIN GLYCOSYLATED A1C: CPT

## 2020-07-01 PROCEDURE — 85025 COMPLETE CBC W/AUTO DIFF WBC: CPT

## 2020-07-01 PROCEDURE — 36415 COLL VENOUS BLD VENIPUNCTURE: CPT

## 2020-07-01 PROCEDURE — 84443 ASSAY THYROID STIM HORMONE: CPT

## 2020-07-01 PROCEDURE — 82306 VITAMIN D 25 HYDROXY: CPT

## 2020-07-01 PROCEDURE — 80061 LIPID PANEL: CPT

## 2020-07-08 ENCOUNTER — OFFICE VISIT (OUTPATIENT)
Dept: PRIMARY CARE CLINIC | Facility: CLINIC | Age: 60
End: 2020-07-08
Attending: FAMILY MEDICINE
Payer: COMMERCIAL

## 2020-07-08 ENCOUNTER — PATIENT MESSAGE (OUTPATIENT)
Dept: PRIMARY CARE CLINIC | Facility: CLINIC | Age: 60
End: 2020-07-08

## 2020-07-08 VITALS
SYSTOLIC BLOOD PRESSURE: 158 MMHG | WEIGHT: 221.88 LBS | OXYGEN SATURATION: 98 % | DIASTOLIC BLOOD PRESSURE: 90 MMHG | HEART RATE: 69 BPM | HEIGHT: 64 IN | BODY MASS INDEX: 37.88 KG/M2

## 2020-07-08 DIAGNOSIS — I25.10 CORONARY ARTERY DISEASE WITHOUT ANGINA PECTORIS, UNSPECIFIED VESSEL OR LESION TYPE, UNSPECIFIED WHETHER NATIVE OR TRANSPLANTED HEART: Chronic | ICD-10-CM

## 2020-07-08 DIAGNOSIS — E55.9 VITAMIN D DEFICIENCY: ICD-10-CM

## 2020-07-08 DIAGNOSIS — I10 ESSENTIAL HYPERTENSION: ICD-10-CM

## 2020-07-08 DIAGNOSIS — R92.8 ABNORMAL MAMMOGRAM OF LEFT BREAST: ICD-10-CM

## 2020-07-08 DIAGNOSIS — Z00.00 ANNUAL PHYSICAL EXAM: Primary | ICD-10-CM

## 2020-07-08 PROCEDURE — 99396 PR PREVENTIVE VISIT,EST,40-64: ICD-10-PCS | Mod: S$GLB,,, | Performed by: FAMILY MEDICINE

## 2020-07-08 PROCEDURE — 3077F PR MOST RECENT SYSTOLIC BLOOD PRESSURE >= 140 MM HG: ICD-10-PCS | Mod: CPTII,S$GLB,, | Performed by: FAMILY MEDICINE

## 2020-07-08 PROCEDURE — 99396 PREV VISIT EST AGE 40-64: CPT | Mod: S$GLB,,, | Performed by: FAMILY MEDICINE

## 2020-07-08 PROCEDURE — 99999 PR PBB SHADOW E&M-EST. PATIENT-LVL IV: CPT | Mod: PBBFAC,,, | Performed by: FAMILY MEDICINE

## 2020-07-08 PROCEDURE — 3080F PR MOST RECENT DIASTOLIC BLOOD PRESSURE >= 90 MM HG: ICD-10-PCS | Mod: CPTII,S$GLB,, | Performed by: FAMILY MEDICINE

## 2020-07-08 PROCEDURE — 99999 PR PBB SHADOW E&M-EST. PATIENT-LVL IV: ICD-10-PCS | Mod: PBBFAC,,, | Performed by: FAMILY MEDICINE

## 2020-07-08 PROCEDURE — 3077F SYST BP >= 140 MM HG: CPT | Mod: CPTII,S$GLB,, | Performed by: FAMILY MEDICINE

## 2020-07-08 PROCEDURE — 3080F DIAST BP >= 90 MM HG: CPT | Mod: CPTII,S$GLB,, | Performed by: FAMILY MEDICINE

## 2020-07-08 NOTE — PROGRESS NOTES
Subjective:       Patient ID: Farideh Andre is a 60 y.o. female.    Chief Complaint: Annual Exam and Breast Mass    Pt presents today for annual exam. Feels great. No complaints exc her usu allergies that are acting up. Takes zyrtec    Sees Dr. Ashraf for GYN.  mammo done few weeks back at [facility]. Mammo repeated along with US that reveals a mass located deeper than expected left breast. Pt scared, teary eyed and worried. Has been referred to Dr. Murphy.     Annual today as well as labs. All lab were wnl and reviewed w pt    Review of Systems   Constitutional: Negative for activity change, appetite change, chills, fatigue, fever and unexpected weight change.   HENT: Positive for congestion and sinus pressure. Negative for ear pain, hearing loss, rhinorrhea, sore throat and trouble swallowing.    Eyes: Negative for photophobia, pain, discharge and visual disturbance.   Respiratory: Negative for apnea, cough, chest tightness, shortness of breath and wheezing.    Cardiovascular: Negative for chest pain, palpitations and leg swelling.   Gastrointestinal: Negative for abdominal distention, abdominal pain, blood in stool, constipation, diarrhea, nausea and vomiting.   Endocrine: Negative for polydipsia and polyuria.   Genitourinary: Negative.  Negative for difficulty urinating, dysuria, hematuria and menstrual problem.   Musculoskeletal: Negative for arthralgias, back pain, joint swelling and neck pain.   Skin: Negative.    Neurological: Negative for dizziness, tremors, weakness, numbness and headaches.   Psychiatric/Behavioral: Negative for behavioral problems, confusion, decreased concentration, dysphoric mood and sleep disturbance. The patient is not nervous/anxious.    All other systems reviewed and are negative.      Objective:      Physical Exam  Constitutional:       Appearance: She is well-developed.   HENT:      Head: Normocephalic and atraumatic.      Right Ear: External ear normal. A middle ear effusion is  present.      Left Ear: External ear normal. A middle ear effusion is present.      Nose: Nose normal.      Mouth/Throat:      Pharynx: Posterior oropharyngeal erythema present. No oropharyngeal exudate.   Eyes:      Pupils: Pupils are equal, round, and reactive to light.   Neck:      Musculoskeletal: Normal range of motion and neck supple.      Thyroid: No thyromegaly.   Cardiovascular:      Rate and Rhythm: Normal rate and regular rhythm.      Heart sounds: Normal heart sounds. No murmur.   Pulmonary:      Effort: Pulmonary effort is normal. No respiratory distress.      Breath sounds: Normal breath sounds. No stridor. No wheezing or rales.   Chest:      Chest wall: No tenderness.   Abdominal:      General: Bowel sounds are normal. There is no distension.      Palpations: Abdomen is soft. There is no mass.      Tenderness: There is no abdominal tenderness. There is no guarding or rebound.   Musculoskeletal: Normal range of motion.         General: No tenderness.   Lymphadenopathy:      Cervical: No cervical adenopathy.   Skin:     General: Skin is warm and dry.      Findings: No erythema.   Neurological:      Mental Status: She is alert and oriented to person, place, and time.      Cranial Nerves: No cranial nerve deficit.      Sensory: No sensory deficit.      Motor: No abnormal muscle tone.      Coordination: Coordination normal.      Deep Tendon Reflexes: Reflexes are normal and symmetric. Reflexes normal.   Psychiatric:         Behavior: Behavior normal.         Thought Content: Thought content normal.         Judgment: Judgment normal.         Assessment:        annual exam  Seasonal allergies  Plan:       PE wnl  Will see Dr. Ashraf for GYN  Dr Murphy for abnormal mammo  HTN controlled    Allergies controlled per pt with OTC's but would like to try something different. Advised to try xyzal      RTC q 6 mos or prn

## 2020-07-14 RX ORDER — ALPRAZOLAM 1 MG/1
1 TABLET ORAL 2 TIMES DAILY
Qty: 60 TABLET | Refills: 0 | Status: SHIPPED | OUTPATIENT
Start: 2020-07-14 | End: 2021-01-27

## 2020-08-07 ENCOUNTER — HOSPITAL ENCOUNTER (EMERGENCY)
Facility: HOSPITAL | Age: 60
Discharge: HOME | End: 2020-08-07
Attending: EMERGENCY MEDICINE
Payer: MEDICARE

## 2020-08-07 VITALS
OXYGEN SATURATION: 96 % | BODY MASS INDEX: 36.32 KG/M2 | RESPIRATION RATE: 18 BRPM | HEART RATE: 90 BPM | TEMPERATURE: 99 F | HEIGHT: 66 IN | WEIGHT: 226 LBS | SYSTOLIC BLOOD PRESSURE: 136 MMHG | DIASTOLIC BLOOD PRESSURE: 61 MMHG

## 2020-08-07 DIAGNOSIS — L03.311 CELLULITIS OF ABDOMINAL WALL: Primary | ICD-10-CM

## 2020-08-07 PROCEDURE — 99284 EMERGENCY DEPT VISIT MOD MDM: CPT

## 2020-08-07 RX ORDER — IBUPROFEN 600 MG/1
600 TABLET ORAL EVERY 6 HOURS PRN
Qty: 20 TABLET | Refills: 0 | Status: SHIPPED | OUTPATIENT
Start: 2020-08-07 | End: 2021-01-27

## 2020-08-07 RX ORDER — ACETAMINOPHEN 325 MG/1
650 TABLET ORAL
Status: DISCONTINUED | OUTPATIENT
Start: 2020-08-07 | End: 2020-08-07

## 2020-08-07 RX ORDER — IBUPROFEN 600 MG/1
600 TABLET ORAL
Status: DISCONTINUED | OUTPATIENT
Start: 2020-08-07 | End: 2020-08-07

## 2020-08-07 RX ORDER — ACETAMINOPHEN 325 MG/1
650 TABLET ORAL EVERY 6 HOURS PRN
Qty: 13 TABLET | Refills: 0 | Status: SHIPPED | OUTPATIENT
Start: 2020-08-07 | End: 2021-01-27

## 2020-08-07 NOTE — DISCHARGE INSTRUCTIONS
Thank you for coming to our Emergency Department today. It is important to remember that some problems are difficult to diagnose and may not be found during your first visit. Be sure to follow up with your primary care doctor and review any labs/imaging that was performed with them. If you do not have a primary care doctor, you may contact the one listed on your discharge paperwork or you may also call the [facility] Appointment Desk at [phone] to schedule an appointment with one.     All medications may potentially have side effects and it is impossible to predict which medications may give you side effects. If you feel that you are having a negative effect of any medication you should immediately stop taking them and seek medical attention.    Return to the ER with any questions/concerns, new/concerning symptoms, worsening or failure to improve. Do not drive or make any important decisions for 24 hours if you have received any pain medications, sedatives or mood altering drugs during your ER visit.

## 2020-08-07 NOTE — ED TRIAGE NOTES
Pt arrived to ED with c/o Wound Check (Pt reports area of redness and swelling to abdomen. Pt seen at dermatologist and diagnosed with cellulitis to abdomen.Pt states she thinks something bit her on her abdomen.

## 2020-08-07 NOTE — ED PROVIDER NOTES
Encounter Date: 2020       History     Chief Complaint   Patient presents with    Wound Check     Pt reports area of redness and swelling to abdomen. Pt seen at dermatologist and diagnosed with cellulitis to abdomen, states she was going to get it lanced this morning but her doctor was sick, she was directed to come to ED.     60-year-old female past medical history of angioedema, coronary disease, hypertension, aortic area presenting secondary to cellulitis on the right side of her abdomen.  There is marked in a [place] on the edges by dermatology.  Pain is 10 at 10, in that area, nonradiating, no alleviating worsening factors.  Patient was told by Dermatology to come in and get it lanced to the fact that she cannot see her today.  No discharge from the area.  No other complaints.  Hurts to press on the area.        Review of patient's allergies indicates:   Allergen Reactions    Sulfa (sulfonamide antibiotics)      Hives    Other reaction(s): Hives     Past Medical History:   Diagnosis Date    Angioedema 2016    Coronary artery disease     Hypertension     Obesity     Urticaria      Past Surgical History:   Procedure Laterality Date     SECTION      COLONOSCOPY N/A 2015    Procedure: COLONOSCOPY;  Surgeon: Harris Guillen MD;  Location: [facility] ([address] [place] [address]);  Service: Endoscopy;  Laterality: N/A;    s/p LAD coated  2009    s/p LAD coated stent  2010    TONSILLECTOMY       Family History   Problem Relation Age of Onset    Heart disease Mother     Diabetes Mother     Stroke Mother     Diabetes Father     Urticaria Son     Breast cancer Neg Hx     Ovarian cancer Neg Hx     Vaginal cancer Neg Hx     Endometrial cancer Neg Hx     Cervical cancer Neg Hx      Social History     Tobacco Use    Smoking status: Never Smoker    Smokeless tobacco: Never Used   Substance Use Topics    Alcohol use: No    Drug use: No     Review of Systems   Constitutional: Negative  for fever.   HENT: Negative for sore throat.    Respiratory: Negative for shortness of breath.    Cardiovascular: Negative for chest pain.   Gastrointestinal: Negative for nausea.   Genitourinary: Negative for dysuria.   Musculoskeletal: Negative for back pain.   Skin: Negative for rash.   Neurological: Negative for weakness.   Hematological: Does not bruise/bleed easily.   All other systems reviewed and are negative.      Physical Exam     Initial Vitals [08/07/20 0504]   BP Pulse Resp Temp SpO2   136/61 90 18 98.8 °F (37.1 °C) 96 %      MAP       --         Physical Exam    Nursing note and vitals reviewed.  Constitutional: She appears well-developed and well-nourished.   HENT:   Head: Normocephalic and atraumatic.   Eyes: EOM are normal. Pupils are equal, round, and reactive to light.   Neck: Normal range of motion. No JVD present.   Cardiovascular: Normal rate and regular rhythm.   Pulmonary/Chest: Breath sounds normal. No respiratory distress. She has no wheezes.   Abdominal: Soft. Bowel sounds are normal.   Musculoskeletal: Normal range of motion. No edema.   Neurological: She is alert and oriented to person, place, and time. She has normal strength. GCS score is 15. GCS eye subscore is 4. GCS verbal subscore is 5. GCS motor subscore is 6.   Skin: Skin is warm and dry. Capillary refill takes less than 2 seconds.   Cellulitis on the right side of the abdomen.  The previous outside lining of the cellulitis was marked.  This actually looks like it is receding from that line   Psychiatric: She has a normal mood and affect. Thought content normal.         ED Course   Procedures  Labs Reviewed - No data to display       Imaging Results    None          Medical Decision Making:   Initial Assessment:   Pt seen w initial presentation of local erythema, warmth, swelling to right side of abdomen. Sensitivity/pain to light touch around the erythematous area. Nontoxic appearing, VSS. No lymphangitic spread visible and no  fluid pockets or fluctuant abscess noted. Low suspicion ofo osteomyelitis or DVT. No immune compromise, bullae, pain out of proportion, or rapid progression necrotizing fasciitis. No purulence, penetrating trauma, known MRSA colonization, IV drug use, or SIRS from MRSA infection.    No h/o prior tx failure, fever, chronic leg ulcers, chronic edema/lyphedema c/f treatment failure.  Plan tx with clindamycin outpatient already and will take ibuprofen Tylenol for pain. Erythema outlined. Return precautions given, patient understands and agrees with plan. All questions answered.  Instructed to follow up with PCP. I discussed with the patient/family the diagnosis, treatment plan, indications for return to the emergency department, and for expected follow-up. The patient/family verbalized an understanding. The patient/family is asked if there are any questions or concerns. We discuss the case, until all issues are addressed to the patient/familys satisfaction. Patient/family understands and is agreeable to the plan.   Aki Bains                                       Clinical Impression:       ICD-10-CM ICD-9-CM   1. Cellulitis of abdominal wall  L03.311 682.2             ED Disposition Condition    Discharge Stable        ED Prescriptions     Medication Sig Dispense Start Date End Date Auth. Provider    ibuprofen (ADVIL,MOTRIN) 600 MG tablet Take 1 tablet (600 mg total) by mouth every 6 (six) hours as needed. 20 tablet 8/7/2020  Aki Bains MD    acetaminophen (TYLENOL) 325 MG tablet Take 2 tablets (650 mg total) by mouth every 6 (six) hours as needed. 13 tablet 8/7/2020  Aki Bains MD        Follow-up Information     Follow up With Specialties Details Why Contact Info    Breonna Multani MD Family Medicine Schedule an appointment as soon as possible for a visit in 2 days  [address] [place] [id number]  [phone]                                       Aki Bains MD  08/07/20  5882

## 2020-08-24 DIAGNOSIS — I10 HYPERTENSION, UNSPECIFIED TYPE: ICD-10-CM

## 2020-08-24 RX ORDER — METOPROLOL SUCCINATE 25 MG/1
25 TABLET, EXTENDED RELEASE ORAL DAILY
Qty: 90 TABLET | Refills: 1 | Status: SHIPPED | OUTPATIENT
Start: 2020-08-24 | End: 2021-03-03 | Stop reason: SDUPTHER

## 2020-08-27 ENCOUNTER — PATIENT MESSAGE (OUTPATIENT)
Dept: PRIMARY CARE CLINIC | Facility: CLINIC | Age: 60
End: 2020-08-27

## 2020-08-27 DIAGNOSIS — E78.5 DYSLIPIDEMIA: ICD-10-CM

## 2020-08-27 RX ORDER — ROSUVASTATIN CALCIUM 40 MG/1
40 TABLET, COATED ORAL NIGHTLY
Qty: 90 TABLET | Refills: 1 | Status: SHIPPED | OUTPATIENT
Start: 2020-08-27 | End: 2021-04-22 | Stop reason: SDUPTHER

## 2020-10-01 ENCOUNTER — PATIENT MESSAGE (OUTPATIENT)
Dept: OTHER | Facility: OTHER | Age: 60
End: 2020-10-01

## 2020-10-08 ENCOUNTER — TELEPHONE (OUTPATIENT)
Dept: PRIMARY CARE CLINIC | Facility: CLINIC | Age: 60
End: 2020-10-08

## 2020-10-08 DIAGNOSIS — Z12.11 SCREEN FOR COLON CANCER: Primary | ICD-10-CM

## 2020-10-08 NOTE — TELEPHONE ENCOUNTER
----- Message from Marleny Paz MA sent at 10/8/2020  1:28 PM CDT -----  Contact: [phone]  Pt is due for her 5 year colonoscopy and is needing orders for scheduling.     Once orders are places, pt can call [phone] to schedule

## 2020-10-09 ENCOUNTER — TELEPHONE (OUTPATIENT)
Dept: ENDOSCOPY | Facility: HOSPITAL | Age: 60
End: 2020-10-09

## 2020-10-09 NOTE — TELEPHONE ENCOUNTER
Surgical clearance request sent to . Patient had breast surgery in 9/2020 and is in the progress of receiving radiation tx to complete in October 2020.

## 2020-10-09 NOTE — TELEPHONE ENCOUNTER
informed pt that orders were placed for the colonoscopy and that she should call to schedule an appt with colonoscopy dept. Pt verbalized understanding.

## 2020-12-11 ENCOUNTER — PATIENT MESSAGE (OUTPATIENT)
Dept: OTHER | Facility: OTHER | Age: 60
End: 2020-12-11

## 2021-01-27 ENCOUNTER — OFFICE VISIT (OUTPATIENT)
Dept: PRIMARY CARE CLINIC | Facility: CLINIC | Age: 61
End: 2021-01-27
Attending: FAMILY MEDICINE
Payer: MEDICARE

## 2021-01-27 VITALS
SYSTOLIC BLOOD PRESSURE: 136 MMHG | OXYGEN SATURATION: 98 % | DIASTOLIC BLOOD PRESSURE: 84 MMHG | WEIGHT: 226.94 LBS | HEART RATE: 72 BPM | BODY MASS INDEX: 36.47 KG/M2 | HEIGHT: 66 IN

## 2021-01-27 DIAGNOSIS — Z12.11 SCREEN FOR COLON CANCER: ICD-10-CM

## 2021-01-27 DIAGNOSIS — Z00.00 ANNUAL PHYSICAL EXAM: ICD-10-CM

## 2021-01-27 DIAGNOSIS — J30.2 SEASONAL ALLERGIES: Primary | ICD-10-CM

## 2021-01-27 DIAGNOSIS — E78.5 DYSLIPIDEMIA: Chronic | ICD-10-CM

## 2021-01-27 DIAGNOSIS — I10 ESSENTIAL HYPERTENSION: ICD-10-CM

## 2021-01-27 PROCEDURE — 99999 PR PBB SHADOW E&M-EST. PATIENT-LVL III: CPT | Mod: PBBFAC,,, | Performed by: FAMILY MEDICINE

## 2021-01-27 PROCEDURE — 99396 PREV VISIT EST AGE 40-64: CPT | Mod: S$PBB,,, | Performed by: FAMILY MEDICINE

## 2021-01-27 PROCEDURE — 99396 PR PREVENTIVE VISIT,EST,40-64: ICD-10-PCS | Mod: S$PBB,,, | Performed by: FAMILY MEDICINE

## 2021-01-27 PROCEDURE — 99999 PR PBB SHADOW E&M-EST. PATIENT-LVL III: ICD-10-PCS | Mod: PBBFAC,,, | Performed by: FAMILY MEDICINE

## 2021-01-27 PROCEDURE — 99213 OFFICE O/P EST LOW 20 MIN: CPT | Mod: PBBFAC,PN | Performed by: FAMILY MEDICINE

## 2021-01-27 RX ORDER — ANASTROZOLE 1 MG/1
1 TABLET ORAL DAILY
COMMUNITY
Start: 2021-01-09

## 2021-01-27 RX ORDER — NAPROXEN SODIUM 220 MG/1
TABLET, FILM COATED ORAL
COMMUNITY
End: 2021-01-27

## 2021-03-03 DIAGNOSIS — I10 HYPERTENSION, UNSPECIFIED TYPE: ICD-10-CM

## 2021-03-05 RX ORDER — METOPROLOL SUCCINATE 25 MG/1
25 TABLET, EXTENDED RELEASE ORAL DAILY
Qty: 90 TABLET | Refills: 1 | Status: SHIPPED | OUTPATIENT
Start: 2021-03-05 | End: 2021-09-20 | Stop reason: SDUPTHER

## 2021-03-10 PROBLEM — N60.99 ATYPICAL DUCTAL HYPERPLASIA OF BREAST: Status: ACTIVE | Noted: 2020-08-13

## 2021-03-10 PROBLEM — D05.12 DUCTAL CARCINOMA IN SITU (DCIS) OF LEFT BREAST: Status: ACTIVE | Noted: 2020-09-17

## 2021-03-10 LAB — CRC RECOMMENDATION EXT: NORMAL

## 2021-04-22 ENCOUNTER — PATIENT MESSAGE (OUTPATIENT)
Dept: PRIMARY CARE CLINIC | Facility: CLINIC | Age: 61
End: 2021-04-22

## 2021-04-22 DIAGNOSIS — E78.5 DYSLIPIDEMIA: ICD-10-CM

## 2021-04-22 RX ORDER — ROSUVASTATIN CALCIUM 40 MG/1
40 TABLET, COATED ORAL NIGHTLY
Qty: 90 TABLET | Refills: 1 | Status: SHIPPED | OUTPATIENT
Start: 2021-04-22 | End: 2021-10-06

## 2021-06-08 ENCOUNTER — PATIENT MESSAGE (OUTPATIENT)
Dept: ADMINISTRATIVE | Facility: HOSPITAL | Age: 61
End: 2021-06-08

## 2021-06-08 ENCOUNTER — PATIENT OUTREACH (OUTPATIENT)
Dept: ADMINISTRATIVE | Facility: HOSPITAL | Age: 61
End: 2021-06-08

## 2021-06-28 ENCOUNTER — TELEPHONE (OUTPATIENT)
Dept: INTERNAL MEDICINE | Facility: CLINIC | Age: 61
End: 2021-06-28

## 2021-07-14 ENCOUNTER — OFFICE VISIT (OUTPATIENT)
Dept: PRIMARY CARE CLINIC | Facility: CLINIC | Age: 61
End: 2021-07-14
Attending: FAMILY MEDICINE
Payer: MEDICARE

## 2021-07-14 ENCOUNTER — LAB VISIT (OUTPATIENT)
Dept: LAB | Facility: HOSPITAL | Age: 61
End: 2021-07-14
Payer: MEDICARE

## 2021-07-14 VITALS
SYSTOLIC BLOOD PRESSURE: 132 MMHG | WEIGHT: 225.06 LBS | DIASTOLIC BLOOD PRESSURE: 84 MMHG | OXYGEN SATURATION: 99 % | HEIGHT: 66 IN | HEART RATE: 62 BPM | BODY MASS INDEX: 36.17 KG/M2

## 2021-07-14 DIAGNOSIS — R79.9 ABNORMAL FINDING OF BLOOD CHEMISTRY, UNSPECIFIED: ICD-10-CM

## 2021-07-14 DIAGNOSIS — E78.5 DYSLIPIDEMIA: Chronic | ICD-10-CM

## 2021-07-14 DIAGNOSIS — M15.9 GENERALIZED OA: ICD-10-CM

## 2021-07-14 DIAGNOSIS — I10 ESSENTIAL HYPERTENSION: ICD-10-CM

## 2021-07-14 DIAGNOSIS — E55.9 VITAMIN D DEFICIENCY: ICD-10-CM

## 2021-07-14 DIAGNOSIS — R31.29 MICROHEMATURIA: ICD-10-CM

## 2021-07-14 DIAGNOSIS — R20.0 NUMBNESS IN FEET: Primary | ICD-10-CM

## 2021-07-14 DIAGNOSIS — T78.40XD ALLERGY, SUBSEQUENT ENCOUNTER: ICD-10-CM

## 2021-07-14 DIAGNOSIS — D05.12 DUCTAL CARCINOMA IN SITU (DCIS) OF LEFT BREAST: ICD-10-CM

## 2021-07-14 DIAGNOSIS — Z00.00 ANNUAL PHYSICAL EXAM: ICD-10-CM

## 2021-07-14 LAB
ALBUMIN SERPL BCP-MCNC: 4 G/DL (ref 3.5–5.2)
ALP SERPL-CCNC: 109 U/L (ref 55–135)
ALT SERPL W/O P-5'-P-CCNC: 36 U/L (ref 10–44)
ANION GAP SERPL CALC-SCNC: 9 MMOL/L (ref 8–16)
AST SERPL-CCNC: 33 U/L (ref 10–40)
BASOPHILS # BLD AUTO: 0.01 K/UL (ref 0–0.2)
BASOPHILS NFR BLD: 0.2 % (ref 0–1.9)
BILIRUB SERPL-MCNC: 0.5 MG/DL (ref 0.1–1)
BUN SERPL-MCNC: 9 MG/DL (ref 6–20)
CALCIUM SERPL-MCNC: 9.6 MG/DL (ref 8.7–10.5)
CHLORIDE SERPL-SCNC: 106 MMOL/L (ref 95–110)
CHOLEST SERPL-MCNC: 117 MG/DL (ref 120–199)
CHOLEST/HDLC SERPL: 2.3 {RATIO} (ref 2–5)
CO2 SERPL-SCNC: 26 MMOL/L (ref 23–29)
CREAT SERPL-MCNC: 0.8 MG/DL (ref 0.5–1.4)
DIFFERENTIAL METHOD: ABNORMAL
EOSINOPHIL # BLD AUTO: 0 K/UL (ref 0–0.5)
EOSINOPHIL NFR BLD: 0 % (ref 0–8)
ERYTHROCYTE [DISTWIDTH] IN BLOOD BY AUTOMATED COUNT: 14 % (ref 11.5–14.5)
EST. GFR  (AFRICAN AMERICAN): >60 ML/MIN/1.73 M^2
EST. GFR  (NON AFRICAN AMERICAN): >60 ML/MIN/1.73 M^2
ESTIMATED AVG GLUCOSE: 117 MG/DL (ref 68–131)
GLUCOSE SERPL-MCNC: 96 MG/DL (ref 70–110)
HBA1C MFR BLD: 5.7 % (ref 4–5.6)
HCT VFR BLD AUTO: 49.6 % (ref 37–48.5)
HDLC SERPL-MCNC: 51 MG/DL (ref 40–75)
HDLC SERPL: 43.6 % (ref 20–50)
HGB BLD-MCNC: 15.9 G/DL (ref 12–16)
IMM GRANULOCYTES # BLD AUTO: 0.01 K/UL (ref 0–0.04)
IMM GRANULOCYTES NFR BLD AUTO: 0.2 % (ref 0–0.5)
LDLC SERPL CALC-MCNC: 53 MG/DL (ref 63–159)
LYMPHOCYTES # BLD AUTO: 1.6 K/UL (ref 1–4.8)
LYMPHOCYTES NFR BLD: 30.6 % (ref 18–48)
MCH RBC QN AUTO: 29.4 PG (ref 27–31)
MCHC RBC AUTO-ENTMCNC: 32.1 G/DL (ref 32–36)
MCV RBC AUTO: 92 FL (ref 82–98)
MONOCYTES # BLD AUTO: 0.5 K/UL (ref 0.3–1)
MONOCYTES NFR BLD: 9.1 % (ref 4–15)
NEUTROPHILS # BLD AUTO: 3.1 K/UL (ref 1.8–7.7)
NEUTROPHILS NFR BLD: 59.9 % (ref 38–73)
NONHDLC SERPL-MCNC: 66 MG/DL
NRBC BLD-RTO: 0 /100 WBC
PLATELET # BLD AUTO: 208 K/UL (ref 150–450)
PMV BLD AUTO: 11.4 FL (ref 9.2–12.9)
POTASSIUM SERPL-SCNC: 4.1 MMOL/L (ref 3.5–5.1)
PROT SERPL-MCNC: 7.6 G/DL (ref 6–8.4)
RBC # BLD AUTO: 5.41 M/UL (ref 4–5.4)
SODIUM SERPL-SCNC: 141 MMOL/L (ref 136–145)
TRIGL SERPL-MCNC: 65 MG/DL (ref 30–150)
TSH SERPL DL<=0.005 MIU/L-ACNC: 0.49 UIU/ML (ref 0.4–4)
WBC # BLD AUTO: 5.16 K/UL (ref 3.9–12.7)

## 2021-07-14 PROCEDURE — 99214 PR OFFICE/OUTPT VISIT, EST, LEVL IV, 30-39 MIN: ICD-10-PCS | Mod: S$PBB,,, | Performed by: FAMILY MEDICINE

## 2021-07-14 PROCEDURE — 99213 OFFICE O/P EST LOW 20 MIN: CPT | Mod: PBBFAC,PN | Performed by: FAMILY MEDICINE

## 2021-07-14 PROCEDURE — 84443 ASSAY THYROID STIM HORMONE: CPT | Performed by: FAMILY MEDICINE

## 2021-07-14 PROCEDURE — 82306 VITAMIN D 25 HYDROXY: CPT | Performed by: FAMILY MEDICINE

## 2021-07-14 PROCEDURE — 80061 LIPID PANEL: CPT | Performed by: FAMILY MEDICINE

## 2021-07-14 PROCEDURE — 99999 PR PBB SHADOW E&M-EST. PATIENT-LVL III: ICD-10-PCS | Mod: PBBFAC,,, | Performed by: FAMILY MEDICINE

## 2021-07-14 PROCEDURE — 85025 COMPLETE CBC W/AUTO DIFF WBC: CPT | Performed by: FAMILY MEDICINE

## 2021-07-14 PROCEDURE — 87389 HIV-1 AG W/HIV-1&-2 AB AG IA: CPT | Performed by: FAMILY MEDICINE

## 2021-07-14 PROCEDURE — 83036 HEMOGLOBIN GLYCOSYLATED A1C: CPT | Performed by: FAMILY MEDICINE

## 2021-07-14 PROCEDURE — 36415 COLL VENOUS BLD VENIPUNCTURE: CPT | Mod: PN | Performed by: FAMILY MEDICINE

## 2021-07-14 PROCEDURE — 99999 PR PBB SHADOW E&M-EST. PATIENT-LVL III: CPT | Mod: PBBFAC,,, | Performed by: FAMILY MEDICINE

## 2021-07-14 PROCEDURE — 80053 COMPREHEN METABOLIC PANEL: CPT | Performed by: FAMILY MEDICINE

## 2021-07-14 PROCEDURE — 99214 OFFICE O/P EST MOD 30 MIN: CPT | Mod: S$PBB,,, | Performed by: FAMILY MEDICINE

## 2021-07-14 RX ORDER — LORATADINE 10 MG/1
10 TABLET ORAL DAILY
COMMUNITY
Start: 2021-05-20

## 2021-07-14 RX ORDER — FLUOCINOLONE ACETONIDE 0.11 MG/ML
OIL TOPICAL
COMMUNITY
Start: 2021-03-18 | End: 2022-05-04

## 2021-07-14 RX ORDER — EPINEPHRINE 0.3 MG/.3ML
INJECTION SUBCUTANEOUS
Qty: 1 EACH | Refills: 0 | Status: SHIPPED | OUTPATIENT
Start: 2021-07-14 | End: 2022-05-04

## 2021-07-15 LAB — 25(OH)D3+25(OH)D2 SERPL-MCNC: 36 NG/ML (ref 30–96)

## 2021-07-16 LAB — HIV 1+2 AB+HIV1 P24 AG SERPL QL IA: NEGATIVE

## 2021-09-20 DIAGNOSIS — I10 HYPERTENSION, UNSPECIFIED TYPE: ICD-10-CM

## 2021-09-20 RX ORDER — METOPROLOL SUCCINATE 25 MG/1
25 TABLET, EXTENDED RELEASE ORAL DAILY
Qty: 90 TABLET | Refills: 1 | Status: SHIPPED | OUTPATIENT
Start: 2021-09-20 | End: 2022-03-16 | Stop reason: SDUPTHER

## 2022-03-16 ENCOUNTER — PATIENT MESSAGE (OUTPATIENT)
Dept: PRIMARY CARE CLINIC | Facility: CLINIC | Age: 62
End: 2022-03-16
Payer: COMMERCIAL

## 2022-03-16 DIAGNOSIS — I10 HYPERTENSION, UNSPECIFIED TYPE: ICD-10-CM

## 2022-03-16 RX ORDER — METOPROLOL SUCCINATE 25 MG/1
25 TABLET, EXTENDED RELEASE ORAL DAILY
Qty: 90 TABLET | Refills: 1 | Status: SHIPPED | OUTPATIENT
Start: 2022-03-16 | End: 2022-05-04 | Stop reason: SDUPTHER

## 2022-03-16 NOTE — TELEPHONE ENCOUNTER
No new care gaps identified.  Powered by [employer]. Reference number: [id number].   3/16/2022 10:20:25 AM CDT

## 2022-04-04 ENCOUNTER — TELEPHONE (OUTPATIENT)
Dept: INTERNAL MEDICINE | Facility: CLINIC | Age: 62
End: 2022-04-04
Payer: COMMERCIAL

## 2022-04-04 ENCOUNTER — OFFICE VISIT (OUTPATIENT)
Dept: PRIMARY CARE CLINIC | Facility: CLINIC | Age: 62
End: 2022-04-04
Attending: FAMILY MEDICINE
Payer: MEDICARE

## 2022-04-04 DIAGNOSIS — R89.9 ABNORMAL LABORATORY TEST: Primary | ICD-10-CM

## 2022-04-04 DIAGNOSIS — D05.12 DUCTAL CARCINOMA IN SITU (DCIS) OF LEFT BREAST: ICD-10-CM

## 2022-04-04 PROCEDURE — 99441 PR PHYSICIAN TELEPHONE EVALUATION 5-10 MIN: CPT | Mod: 95,,, | Performed by: FAMILY MEDICINE

## 2022-04-04 PROCEDURE — 99441 PR PHYSICIAN TELEPHONE EVALUATION 5-10 MIN: ICD-10-PCS | Mod: 95,,, | Performed by: FAMILY MEDICINE

## 2022-04-04 NOTE — TELEPHONE ENCOUNTER
Pt states that she is flying home today and received her lab results from [facility] and is upset about the results. Pt states that doctor who ordered the labs is out today and has not return her call. Pt would like to discuss these results with Dr. Multani. Informed pt that Dr. Multani does not have these lab results and asked her to send them to us. Pt requested an appt with Dr. Multani today. schedule pt for 4/4/22 with Dr. Multani. Pt has no further questions or concerns.

## 2022-04-04 NOTE — TELEPHONE ENCOUNTER
----- Message from Jennifer Roach sent at 4/4/2022  1:59 PM CDT -----  Regarding: Patient call back  Who called:HARPREET BAIG [[id number]]    What is the request in detail: Patient is requesting a call back. She states she would like to speak with the provider after 6 pm as she will be on a plane until then. She would like to further discuss.   Please advise.    Can the clinic reply by [facility]? No    Best call back number:[phone]    Additional Information: N/A

## 2022-04-05 NOTE — PROGRESS NOTES
Established Patient - Audio Only Telehealth Visit     The patient location is: HOME  The chief complaint leading to consultation is: abnllabs  Visit type: Virtual visit with audio only (telephone)  Total time spent with patient: 15 mins       The reason for the audio only service rather than synchronous audio and video virtual visit was related to technical difficulties or patient preference/necessity.     Each patient to whom I provide medical services by telemedicine is:  (1) informed of the relationship between the physician and patient and the respective role of any other health care provider with respect to management of the patient; and (2) notified that they may decline to receive medical services by telemedicine and may withdraw from such care at any time. Patient verbally consented to receive this service via voice-only telephone call.       HPI: pt states that she had labs done w hemeonc to follow some that were not normal. Pt saw few that were still in yellow and is now panicking worried that cancer might be back etc...     Assessment and plan:    Explained to pt that I am not hemeonc and am not clear what criteria/cut offs they use and what they are exactly looking for however, there does not seem at this time a reason for concern since labs do seem to be improving such as alkphos.   Pt feels reassured more but is advised to wait for call back from specialist. Will do so    RTC prn                        This service was not originating from a related E/M service provided within the previous 7 days nor will  to an E/M service or procedure within the next 24 hours or my soonest available appointment.  Prevailing standard of care was able to be met in this audio-only visit.

## 2022-05-02 ENCOUNTER — TELEPHONE (OUTPATIENT)
Dept: INTERNAL MEDICINE | Facility: CLINIC | Age: 62
End: 2022-05-02
Payer: COMMERCIAL

## 2022-05-02 DIAGNOSIS — I10 PRIMARY HYPERTENSION: ICD-10-CM

## 2022-05-02 DIAGNOSIS — R79.9 ABNORMAL FINDING OF BLOOD CHEMISTRY, UNSPECIFIED: ICD-10-CM

## 2022-05-02 DIAGNOSIS — E78.5 DYSLIPIDEMIA: Primary | ICD-10-CM

## 2022-05-02 NOTE — TELEPHONE ENCOUNTER
----- Message from Kianna Barker sent at 5/2/2022 12:02 PM CDT -----  Name of Who is Calling: HARPREET BAIG          What is the request in detail: The patient is calling to request lab orders to be put in so she can have done before her appointment. Please advise          Can the clinic reply by [facility]: Yes         [facility]: [phone]

## 2022-05-03 NOTE — TELEPHONE ENCOUNTER
Spoke with pt to make appt for lab. Pt denied, pt states she will make a lab appt at a later date.

## 2022-05-04 ENCOUNTER — HOSPITAL ENCOUNTER (OUTPATIENT)
Dept: RADIOLOGY | Facility: HOSPITAL | Age: 62
Discharge: HOME | End: 2022-05-04
Attending: INTERNAL MEDICINE
Payer: MEDICARE

## 2022-05-04 ENCOUNTER — OFFICE VISIT (OUTPATIENT)
Dept: PRIMARY CARE CLINIC | Facility: CLINIC | Age: 62
End: 2022-05-04
Attending: FAMILY MEDICINE
Payer: MEDICARE

## 2022-05-04 ENCOUNTER — OFFICE VISIT (OUTPATIENT)
Dept: RHEUMATOLOGY | Facility: CLINIC | Age: 62
End: 2022-05-04
Payer: MEDICARE

## 2022-05-04 ENCOUNTER — PATIENT MESSAGE (OUTPATIENT)
Dept: PRIMARY CARE CLINIC | Facility: CLINIC | Age: 62
End: 2022-05-04

## 2022-05-04 VITALS
WEIGHT: 231.69 LBS | HEIGHT: 66 IN | SYSTOLIC BLOOD PRESSURE: 141 MMHG | BODY MASS INDEX: 37.23 KG/M2 | HEART RATE: 80 BPM | DIASTOLIC BLOOD PRESSURE: 75 MMHG

## 2022-05-04 VITALS
DIASTOLIC BLOOD PRESSURE: 60 MMHG | WEIGHT: 226.06 LBS | HEART RATE: 66 BPM | OXYGEN SATURATION: 97 % | HEIGHT: 66 IN | SYSTOLIC BLOOD PRESSURE: 129 MMHG | BODY MASS INDEX: 36.33 KG/M2

## 2022-05-04 DIAGNOSIS — M25.50 ARTHRALGIA, UNSPECIFIED JOINT: ICD-10-CM

## 2022-05-04 DIAGNOSIS — G89.29 CHRONIC PAIN OF BOTH KNEES: ICD-10-CM

## 2022-05-04 DIAGNOSIS — M25.50 PAIN IN JOINT INVOLVING MULTIPLE SITES: ICD-10-CM

## 2022-05-04 DIAGNOSIS — M15.9 GENERALIZED OA: ICD-10-CM

## 2022-05-04 DIAGNOSIS — E55.9 VITAMIN D INSUFFICIENCY: ICD-10-CM

## 2022-05-04 DIAGNOSIS — E78.5 DYSLIPIDEMIA: ICD-10-CM

## 2022-05-04 DIAGNOSIS — M25.562 CHRONIC PAIN OF BOTH KNEES: ICD-10-CM

## 2022-05-04 DIAGNOSIS — M75.01 ADHESIVE CAPSULITIS OF BOTH SHOULDERS: ICD-10-CM

## 2022-05-04 DIAGNOSIS — D05.12 DUCTAL CARCINOMA IN SITU (DCIS) OF LEFT BREAST: ICD-10-CM

## 2022-05-04 DIAGNOSIS — M75.02 ADHESIVE CAPSULITIS OF BOTH SHOULDERS: ICD-10-CM

## 2022-05-04 DIAGNOSIS — M25.561 CHRONIC PAIN OF BOTH KNEES: ICD-10-CM

## 2022-05-04 DIAGNOSIS — E66.9 OBESITY (BMI 30-39.9): ICD-10-CM

## 2022-05-04 DIAGNOSIS — E55.9 VITAMIN D DEFICIENCY: ICD-10-CM

## 2022-05-04 DIAGNOSIS — E66.9 OBESITY (BMI 30-39.9): Chronic | ICD-10-CM

## 2022-05-04 DIAGNOSIS — M25.50 ARTHRALGIA, UNSPECIFIED JOINT: Primary | ICD-10-CM

## 2022-05-04 DIAGNOSIS — M25.50 PAIN IN JOINT INVOLVING MULTIPLE SITES: Primary | ICD-10-CM

## 2022-05-04 DIAGNOSIS — Z55.9 EDUCATIONAL CIRCUMSTANCE: ICD-10-CM

## 2022-05-04 DIAGNOSIS — I10 HYPERTENSION, UNSPECIFIED TYPE: ICD-10-CM

## 2022-05-04 PROCEDURE — 73562 X-RAY EXAM OF KNEE 3: CPT | Mod: 26,50,, | Performed by: RADIOLOGY

## 2022-05-04 PROCEDURE — 99205 OFFICE O/P NEW HI 60 MIN: CPT | Mod: S$PBB,,, | Performed by: INTERNAL MEDICINE

## 2022-05-04 PROCEDURE — 73030 X-RAY EXAM OF SHOULDER: CPT | Mod: TC,RT

## 2022-05-04 PROCEDURE — 99214 OFFICE O/P EST MOD 30 MIN: CPT | Mod: S$PBB,,, | Performed by: FAMILY MEDICINE

## 2022-05-04 PROCEDURE — 73030 X-RAY EXAM OF SHOULDER: CPT | Mod: 26,LT,, | Performed by: RADIOLOGY

## 2022-05-04 PROCEDURE — 99213 OFFICE O/P EST LOW 20 MIN: CPT | Mod: PBBFAC,PN | Performed by: FAMILY MEDICINE

## 2022-05-04 PROCEDURE — 99214 PR OFFICE/OUTPT VISIT, EST, LEVL IV, 30-39 MIN: ICD-10-PCS | Mod: S$PBB,,, | Performed by: FAMILY MEDICINE

## 2022-05-04 PROCEDURE — 73562 X-RAY EXAM OF KNEE 3: CPT | Mod: TC,50

## 2022-05-04 PROCEDURE — 99999 PR PBB SHADOW E&M-EST. PATIENT-LVL III: CPT | Mod: PBBFAC,,, | Performed by: FAMILY MEDICINE

## 2022-05-04 PROCEDURE — 99205 PR OFFICE/OUTPT VISIT, NEW, LEVL V, 60-74 MIN: ICD-10-PCS | Mod: S$PBB,,, | Performed by: INTERNAL MEDICINE

## 2022-05-04 PROCEDURE — 99215 OFFICE O/P EST HI 40 MIN: CPT | Mod: PBBFAC,27 | Performed by: INTERNAL MEDICINE

## 2022-05-04 PROCEDURE — 99999 PR PBB SHADOW E&M-EST. PATIENT-LVL V: ICD-10-PCS | Mod: PBBFAC,,, | Performed by: INTERNAL MEDICINE

## 2022-05-04 PROCEDURE — 73030 XR SHOULDER COMPLETE 2 OR MORE VIEWS LEFT: ICD-10-PCS | Mod: 26,LT,, | Performed by: RADIOLOGY

## 2022-05-04 PROCEDURE — 73030 XR SHOULDER COMPLETE 2 OR MORE VIEWS RIGHT: ICD-10-PCS | Mod: 26,RT,, | Performed by: RADIOLOGY

## 2022-05-04 PROCEDURE — 73030 X-RAY EXAM OF SHOULDER: CPT | Mod: 26,RT,, | Performed by: RADIOLOGY

## 2022-05-04 PROCEDURE — 73030 X-RAY EXAM OF SHOULDER: CPT | Mod: TC,LT

## 2022-05-04 PROCEDURE — 99999 PR PBB SHADOW E&M-EST. PATIENT-LVL III: ICD-10-PCS | Mod: PBBFAC,,, | Performed by: FAMILY MEDICINE

## 2022-05-04 PROCEDURE — 99999 PR PBB SHADOW E&M-EST. PATIENT-LVL V: CPT | Mod: PBBFAC,,, | Performed by: INTERNAL MEDICINE

## 2022-05-04 PROCEDURE — 73562 XR KNEE 3 VIEW BILATERAL: ICD-10-PCS | Mod: 26,50,, | Performed by: RADIOLOGY

## 2022-05-04 RX ORDER — ROSUVASTATIN CALCIUM 40 MG/1
40 TABLET, COATED ORAL NIGHTLY
Qty: 90 TABLET | Refills: 1 | Status: SHIPPED | OUTPATIENT
Start: 2022-05-04 | End: 2022-11-01

## 2022-05-04 RX ORDER — METOPROLOL SUCCINATE 25 MG/1
25 TABLET, EXTENDED RELEASE ORAL DAILY
Qty: 90 TABLET | Refills: 1 | Status: SHIPPED | OUTPATIENT
Start: 2022-05-04 | End: 2023-03-07

## 2022-05-04 RX ORDER — ROSUVASTATIN CALCIUM 40 MG/1
TABLET, COATED ORAL
Qty: 90 TABLET | Refills: 1 | OUTPATIENT
Start: 2022-05-04

## 2022-05-04 RX ORDER — NAPROXEN SODIUM 220 MG
220 TABLET ORAL
COMMUNITY
End: 2023-04-21

## 2022-05-04 RX ORDER — ANASTROZOLE 1 MG/1
1 TABLET ORAL DAILY
Qty: 90 TABLET | Refills: 1 | Status: CANCELLED | OUTPATIENT
Start: 2022-05-04

## 2022-05-04 RX ORDER — IBUPROFEN 200 MG
400 TABLET ORAL 3 TIMES DAILY
COMMUNITY

## 2022-05-04 SDOH — SOCIAL DETERMINANTS OF HEALTH (SDOH): PROBLEMS RELATED TO EDUCATION AND LITERACY, UNSPECIFIED: Z55.9

## 2022-05-04 ASSESSMENT — ROUTINE ASSESSMENT OF PATIENT INDEX DATA (RAPID3)
MDHAQ FUNCTION SCORE: 1.4
PAIN SCORE: 5.5
PSYCHOLOGICAL DISTRESS SCORE: 2.2
FATIGUE SCORE: 0
PATIENT GLOBAL ASSESSMENT SCORE: 5.5
TOTAL RAPID3 SCORE: 5.22
AM STIFFNESS SCORE: 1, YES

## 2022-05-04 NOTE — PATIENT INSTRUCTIONS
Preferably topicals are safer than NSAIDs.  Biofreeze roll on may help.  You can also try voltaren gel.  Acetaminophen is safer than NSAIDS.  You can take up to a total 3,000 mg/day  For ever 1 lb lost, you lose 3 lb off your knees.    Do the exercises shown.    Do not eat late at night.

## 2022-05-04 NOTE — PROGRESS NOTES
Subjective:       Patient ID: Farideh Andre is a 62 y.o. female.    Chief Complaint: Annual Exam, Shoulder Pain, Knee Pain, Wrist Pain, and Elbow Pain    Pt presents today for annual exam. Feels great. No complaints exc for ongoing joint aches and pains. Wants to know why her hands, wrists, knees, shoulders are all aching here. I explained that I still suspect arimidex but pt states that heme doc told her at this point it cannot be so. pt then asks what else can be done.    Upon further convo, pt does mention that she walked up and down hills while visiting [place], and while there was helping a friend pack up to move. Was waling up and down stairs, lifting items and bending etc... this occurred 3 weeks ago at the same time as her pain began  Pt taking NSAIDS and tylenol which do help but not enough      Denies any n/v/f/chills      URI   Pertinent negatives include no abdominal pain, chest pain, congestion, coughing, diarrhea, dysuria, ear pain, headaches, nausea, neck pain, rhinorrhea, sore throat, vomiting or wheezing.   Shoulder Pain   Pertinent negatives include no fever, headaches or numbness.   Knee Pain   Pertinent negatives include no numbness.   Wrist Pain   Pertinent negatives include no fever or numbness.   Elbow Pain  Associated symptoms include arthralgias and joint swelling. Pertinent negatives include no abdominal pain, chest pain, chills, congestion, coughing, fatigue, fever, headaches, myalgias, nausea, neck pain, numbness, sore throat, vomiting or weakness.     Review of Systems   Constitutional: Negative for activity change, appetite change, chills, fatigue, fever and unexpected weight change.   HENT: Negative for congestion, ear pain, hearing loss, rhinorrhea, sinus pressure, sore throat and trouble swallowing.    Eyes: Negative for photophobia, pain, discharge and visual disturbance.   Respiratory: Negative for apnea, cough, chest tightness, shortness of breath and wheezing.     Cardiovascular: Negative for chest pain, palpitations and leg swelling.   Gastrointestinal: Negative for abdominal distention, abdominal pain, blood in stool, constipation, diarrhea, nausea and vomiting.   Endocrine: Negative for polydipsia and polyuria.   Genitourinary: Negative.  Negative for difficulty urinating, dysuria, hematuria and menstrual problem.   Musculoskeletal: Positive for arthralgias and joint swelling. Negative for back pain, gait problem, myalgias, neck pain and neck stiffness.   Skin: Negative.    Neurological: Negative for dizziness, tremors, weakness, numbness and headaches.   Psychiatric/Behavioral: Negative for behavioral problems, confusion, decreased concentration, dysphoric mood and sleep disturbance. The patient is not nervous/anxious.    All other systems reviewed and are negative.      Objective:      Physical Exam  Vitals reviewed.   Constitutional:       General: She is not in acute distress.     Appearance: Normal appearance. She is well-developed and normal weight. She is not ill-appearing, toxic-appearing or diaphoretic.   HENT:      Head: Normocephalic and atraumatic.      Right Ear: Ear canal and external ear normal. A middle ear effusion is present. There is no impacted cerumen.      Left Ear: Ear canal and external ear normal. A middle ear effusion is present. There is no impacted cerumen.      Nose: Nose normal.      Mouth/Throat:      Pharynx: Posterior oropharyngeal erythema present. No oropharyngeal exudate.   Eyes:      Extraocular Movements: Extraocular movements intact.      Conjunctiva/sclera: Conjunctivae normal.      Pupils: Pupils are equal, round, and reactive to light.   Neck:      Thyroid: No thyromegaly.   Cardiovascular:      Rate and Rhythm: Normal rate and regular rhythm.      Pulses: Normal pulses.      Heart sounds: Normal heart sounds. No murmur heard.  Pulmonary:      Effort: Pulmonary effort is normal. No respiratory distress.      Breath sounds: Normal  breath sounds. No stridor. No wheezing, rhonchi or rales.   Chest:      Chest wall: No tenderness.   Abdominal:      General: Bowel sounds are normal. There is no distension.      Palpations: Abdomen is soft. There is no mass.      Tenderness: There is no abdominal tenderness. There is no guarding or rebound.   Musculoskeletal:         General: Swelling and tenderness present. Injury: pos crepitus b/l knees. LROM upper extremities. Normal range of motion.      Cervical back: Normal range of motion and neck supple.      Right lower leg: No edema.      Left lower leg: No edema.   Lymphadenopathy:      Cervical: No cervical adenopathy.   Skin:     General: Skin is warm and dry.      Findings: No erythema.   Neurological:      General: No focal deficit present.      Mental Status: She is alert and oriented to person, place, and time. Mental status is at baseline.      Cranial Nerves: No cranial nerve deficit.      Sensory: No sensory deficit.      Motor: No weakness or abnormal muscle tone.      Coordination: Coordination normal.      Gait: Gait normal.      Deep Tendon Reflexes: Reflexes are normal and symmetric. Reflexes normal.   Psychiatric:         Mood and Affect: Mood normal.         Behavior: Behavior normal.         Thought Content: Thought content normal.         Judgment: Judgment normal.         Assessment:       1. Arthralgia, unspecified joint    2. Hypertension, unspecified type    3. Dyslipidemia    4. Ductal carcinoma in situ (DCIS) of left breast      annual exam  Seasonal allergies  Plan:       PE wnl  Will see Dr. Ashraf for GYN  Allergies controlled per pt with OTC's  Arthralgia, unspecified joint  -     Ambulatory referral/consult to Rheumatology; Future; Expected date: 05/11/2022  -     Varicella Zoster Antibody, IgG; Future; Expected date: 05/04/2022    Hypertension, unspecified type  -     metoprolol succinate (TOPROL-XL) 25 MG 24 hr tablet; Take 1 tablet (25 mg total) by mouth once daily.   Dispense: 90 tablet; Refill: 1    Dyslipidemia  -     rosuvastatin (CRESTOR) 40 MG Tab; Take 1 tablet (40 mg total) by mouth every evening.  Dispense: 90 tablet; Refill: 1    Ductal carcinoma in situ (DCIS) of left breast      Suspect that this is the moving that caused the OA to flare. However, this isnt a new concern of patients and I do think she should get a second opinion with rheum. Pt amenable    Cont present management for chol, HTN controlled    RTC q 6 mos or prn

## 2022-05-04 NOTE — TELEPHONE ENCOUNTER
Care Due:                  Date            Visit Type   Department     Provider  --------------------------------------------------------------------------------                                VIRTUAL      [facility]  Last Visit: 04-      AUDIO ONLY   CARE           Breonna Multani  Next Visit: None Scheduled  None         None Found                                                            Last  Test          Frequency    Reason                     Performed    Due Date  --------------------------------------------------------------------------------    CMP.........  12 months..  rosuvastatin.............  07-   07-    Lipid Panel.  12 months..  rosuvastatin.............  07-   07-    [employer] Embedded Care Gaps. Reference number: [id number]. 5/04/2022   5:49:04 AM CDT

## 2022-05-04 NOTE — PROGRESS NOTES
Subjective:     Patient ID: Farideh Andre is a 62 y.o. female sent by her PCP, Dr. Breonna Multani for consultation on arthralgia    Chief Complaint: No chief complaint on file.       HPI     End of Jan 2022 developed L knee w some swelling. She saw an Orthopedic surgeon at [facility], had imaging & was told she had arthritis in L knee & had steroid injection that helped for x 2 months.  She has had minor trauma to that knee previously. Had fallen on it several times w grandkids  Subsequently, hands began bothering her w numbness & tingling.  Orthopedics said she had shawn CTS; also saw Dr. Garrison in Dr. Chin's office who confirmed & injected both wrists--they haven't hurt too much since.  Last month developed more arthralgia. Stiff in AM up to 1-2 hrs. Shoulders began hurting first, then both knees, some in elbows and wrists.  Shoulders hurt more in AM & has trouble lifting them.  Has had tendinitis in her shoulders in the past. Pain/stiffness Improves through use but never totally goes away. Ibuprofen & aleve helped a bit. Combining w acetaminophen also helps a bit.   But knees still hurt going up stairs, walking; gelling easily. Icy hot helps more than aspercreme.    No CTD in family  Current Outpatient Medications   Medication Sig Dispense Refill    anastrozole (ARIMIDEX) 1 mg Tab Take 1 mg by mouth once daily.      aspirin (ECOTRIN) 81 MG EC tablet Take 81 mg by mouth. 1 Tablet, Delayed Release (E.C.) Oral Every day      ibuprofen (ADVIL,MOTRIN) 200 MG tablet Take 400 mg by mouth 3 (three) times daily.      loratadine (CLARITIN) 10 mg tablet Take 10 mg by mouth once daily.      metoprolol succinate (TOPROL-XL) 25 MG 24 hr tablet Take 1 tablet (25 mg total) by mouth once daily. 90 tablet 1    naproxen sodium (ANAPROX) 220 MG tablet Take 220 mg by mouth 3 (three) times daily with meals.      rosuvastatin (CRESTOR) 40 MG Tab Take 1 tablet (40 mg total) by mouth every evening. 90 tablet 1      No current facility-administered medications for this visit.         Review of patient's allergies indicates:   Allergen Reactions    Sulfa (sulfonamide antibiotics)      Hives    Other reaction(s): Hives       Review of Systems   Constitutional: Negative for fatigue, fever and unexpected weight change.   HENT: Negative for dental problem, mouth sores, tinnitus and trouble swallowing.    Eyes: Negative.  Negative for redness, itching and visual disturbance.   Respiratory: Negative.  Negative for cough and shortness of breath.    Cardiovascular: Negative.  Negative for chest pain, palpitations (in past) and leg swelling.   Gastrointestinal: Negative.  Negative for abdominal pain, anal bleeding, blood in stool, constipation, diarrhea and nausea.   Genitourinary: Negative for dysuria, frequency, menstrual problem, pelvic pain and urgency.   Musculoskeletal: Positive for arthralgias. Negative for gait problem, joint swelling (feels shoulders and knees), myalgias and neck pain (not too bad; ).   Skin: Negative.  Negative for color change and rash.        Urticaria before  requiring steroids.  No psoriasis.   Neurological: Negative.  Negative for dizziness, tremors, seizures, syncope, facial asymmetry, weakness, light-headedness, numbness (in past in fingers) and headaches.   Hematological: Negative for adenopathy. Does not bruise/bleed easily.   Psychiatric/Behavioral: Positive for sleep disturbance (falling & staying; shoulders & knees hurt at night.). Negative for decreased concentration and suicidal ideas. The patient is not nervous/anxious.        Past Medical History:   Diagnosis Date    Angioedema 2016    Coronary artery disease     Hypertension     Obesity     Urticaria        Past Surgical History:   Procedure Laterality Date     SECTION      COLONOSCOPY N/A 2015    Procedure: COLONOSCOPY;  Surgeon: Harris Guillen MD;  Location: [facility] ([address] [place] [address]);  Service: Endoscopy;   "Laterality: N/A;    s/p LAD coated  03/02/2009    s/p LAD coated stent  12/16/2010    TONSILLECTOMY         Family History   Problem Relation Age of Onset    Heart disease Mother     Diabetes Mother     Stroke Mother     Diabetes Father     Urticaria Son     Breast cancer Neg Hx     Ovarian cancer Neg Hx     Vaginal cancer Neg Hx     Endometrial cancer Neg Hx     Cervical cancer Neg Hx        Social History     Tobacco Use    Smoking status: Never Smoker    Smokeless tobacco: Never Used   Substance Use Topics    Alcohol use: No    Drug use: No   Retired    Objective:   BP (!) 141/75   Pulse 80   Ht 5' 6" (1.676 m)   Wt 105.1 kg (231 lb 11.3 oz)   BMI 37.40 kg/m²       Physical Exam   Constitutional: She is oriented to person, place, and time. No distress.   HENT:   Head: Normocephalic and atraumatic.   Mouth/Throat: Oropharynx is clear and moist. No oropharyngeal exudate.   No facial rashes  Parotids not enlarged  No oral ulcers   Eyes: Pupils are equal, round, and reactive to light. Conjunctivae are normal. Right eye exhibits no discharge. Left eye exhibits no discharge. No scleral icterus.   Neck: No JVD present. No tracheal deviation present. No thyromegaly present.   Cardiovascular: Normal rate, regular rhythm and normal heart sounds. Exam reveals no gallop and no friction rub.   No murmur heard.  Pulmonary/Chest: Effort normal and breath sounds normal. No respiratory distress. She has no wheezes. She has no rales. She exhibits no tenderness.   Abdominal: Soft. Bowel sounds are normal. She exhibits no distension and no mass. There is no splenomegaly or hepatomegaly. There is no abdominal tenderness. There is no rebound and no guarding.   Musculoskeletal:         General: No tenderness. Normal range of motion.      Cervical back: Neck supple.      Comments: Cspine FROM no tenderness  Tspine FROM no tenderness  Lspine FROM no tenderness.  TMJ: unremarkable  Shoulders: FROM; no " synovitis;  Elbows: FROM; no synovitis; no tophi or nodules  Wrists: FROM; no synovitis;    MCPs: FROM; no synovitis; no metacarpalgia;  ok;  PIPs:FROM; no synovitis;   DIPs: FROM; no synovitis;   HIPS: FROM  Knees: FROM; no synovitis; no instability;  Ankles: FROM: no synovitis   Toes: ok;        Lymphadenopathy:     She has no cervical adenopathy.   Neurological: She is alert and oriented to person, place, and time. She has normal reflexes. No cranial nerve deficit. Gait normal.   Proximal and distal muscle strength 5/5.   Skin: Skin is warm and dry. No rash noted. She is not diaphoretic.   Psychiatric: Mood, memory, affect and judgment normal.   Vitals reviewed.    5/4/22: Labs today  Assessment:   Joint pain   Bilateral knees   Bilateral shoulder    Bilateral adhesive capsulitis    On anastrozole  S/P DCIS L breast 6/2020   S/P segmental mastectomy   S/P radiation rx   On aromatase inhibitor since 11/2020  CAD   LAD stenosis w PTCA w stent 2010  Prediabetes  HTN  Obesity  Plan:   Discussed dx and lack of evidence to support RA or similar CTD.  Joint pain likely combination of OA + effect of aromatase inhibitor.  In an abundance of caution will complete labs & get imaging.  Adhesive capsulitis exercises (3 types) shown for each shoulder.  Encouraged weight loss.  Topicals safer than acetaminophen which is safer than NSAIDs.   A trial of a good brand of glucosamine/chondroitin may be helpful. Cosamin ASU is my preferred brand. Use x 3 months & if response continue. If not stop.    RTC prn    CC: Breonna Multani MD      Addendum: 5/4/22: ESR 29 (36); CRP <0.3; RF/CCP neg; vit D 34;    5/4/22: Bilateral shoulders: personally viewed: mild OA changes;   5/4/22: Bilateral knees: personally viewed: mild OA changes.

## 2022-05-04 NOTE — PROGRESS NOTES
No surprises on your labs or x-rays.  Inflammatory numbers are both normal.  Rheumatoid arthritis labs are negative.   Vitamin D level is ok.  Your x-rays do show mild osteoarthritis (wear & tear type of arthritis) that is not unusual for your age & weight.  As discussed anastrozole may intensify your discomfort.  Daily, aerobic, low impact exercise is good for you.  Slow weight loss (for every 1 lb you lose, you lose 3 lb off your knees).  You may want to try a 3 month trial of an over the counter glucosamin/chondroitin preparation.  A good one is Cosamin ASU-3 capsules daily. If this helps then continue it beyond 3 months. If not stop it after 3 months.

## 2022-05-04 NOTE — TELEPHONE ENCOUNTER
Refill Authorization Note   Farideh Andre  is requesting a refill authorization.  Brief Assessment and Rationale for Refill:  Quick Discontinue          Medication Reconciliation Completed: No   Comments:     Note composed:1:12 PM 05/04/2022

## 2022-05-16 ENCOUNTER — OFFICE VISIT (OUTPATIENT)
Dept: PRIMARY CARE CLINIC | Facility: CLINIC | Age: 62
End: 2022-05-16
Attending: FAMILY MEDICINE
Payer: MEDICARE

## 2022-05-16 DIAGNOSIS — M25.519 ARTHRALGIA OF SHOULDER, UNSPECIFIED LATERALITY: ICD-10-CM

## 2022-05-16 DIAGNOSIS — E66.01 MORBID OBESITY: ICD-10-CM

## 2022-05-16 DIAGNOSIS — I10 PRIMARY HYPERTENSION: ICD-10-CM

## 2022-05-16 DIAGNOSIS — R73.09 ELEVATED HEMOGLOBIN A1C: Primary | ICD-10-CM

## 2022-05-16 DIAGNOSIS — E66.9 OBESITY (BMI 30-39.9): Chronic | ICD-10-CM

## 2022-05-16 PROCEDURE — 99442 PR PHYSICIAN TELEPHONE EVALUATION 11-20 MIN: CPT | Mod: 95,,, | Performed by: FAMILY MEDICINE

## 2022-05-16 PROCEDURE — 99442 PR PHYSICIAN TELEPHONE EVALUATION 11-20 MIN: ICD-10-PCS | Mod: 95,,, | Performed by: FAMILY MEDICINE

## 2022-05-17 ENCOUNTER — TELEPHONE (OUTPATIENT)
Dept: ADMINISTRATIVE | Facility: OTHER | Age: 62
End: 2022-05-17
Payer: COMMERCIAL

## 2022-05-17 NOTE — PROGRESS NOTES
Established Patient - Audio Only Telehealth Visit     The patient location is: HOME  The chief complaint leading to consultation is: elev a1c, obesity  Visit type: Virtual visit with audio only (telephone)  Total time spent with patient: 15 mins       The reason for the audio only service rather than synchronous audio and video virtual visit was related to technical difficulties or patient preference/necessity.     Each patient to whom I provide medical services by telemedicine is:  (1) informed of the relationship between the physician and patient and the respective role of any other health care provider with respect to management of the patient; and (2) notified that they may decline to receive medical services by telemedicine and may withdraw from such care at any time. Patient verbally consented to receive this service via voice-only telephone call.       HPI: pt informed that she can go to bariatric med to see if she qualifies for Wegovy since I am unclear if Ozempic will be covered for her off the bat. She is also on the border for prediabetes and has not implemented lifestyle mods first which would be advisable as first thing to do.     Assessment and plan:    Pt amenable. Will implement better eating habits and will try to exercise. Will see bariatrics for input  Suspect cause of diffuse joint aches is still the arimidex. Pt states that rheum also feels this way as well  Repeat a1c in 3 mos                        This service was not originating from a related E/M service provided within the previous 7 days nor will  to an E/M service or procedure within the next 24 hours or my soonest available appointment.  Prevailing standard of care was able to be met in this audio-only visit.

## 2022-05-30 ENCOUNTER — PATIENT MESSAGE (OUTPATIENT)
Dept: ADMINISTRATIVE | Facility: HOSPITAL | Age: 62
End: 2022-05-30
Payer: COMMERCIAL

## 2022-06-27 NOTE — PROGRESS NOTES
Subjective:     Patient ID: Farideh Andre is a 62 y.o. female sent by her PCP, Dr. Breonna Multani for consultation on arthralgia    Chief Complaint: No chief complaint on file.       HPI   62 yr old lady seen once on 5/4/22 as a consultation for Dr. Multani for arthralgia.  She has generalized OA, bilateral adhesive capsulitis and is also on anastrozole. She was educated on her issues and asked to return prn. Labs and imaging were obtained. She was concerned about RA but there was no evidence to support this. A suggestion was made of possible benefit from duloxetine, but patient states her PCP did not feel it was indicated.    Returns c/o pain. Now she is having pain and perceived swelling in her hands & wrists. She answers affirmatively to muscles always feeling stiff & achy & legs feeling uncomfortable & restless when trying to sleep. She answers often to feeling tired and unrefreshed when she wakes up from sleep, tires easily, has pain all over her body & does not sleep well. She c/o AM stiffness that often lasts full day. She had stopped anastrozole for a while and her sxs did not improve.  She is here because of pain.        Widespread pain index = 8  Shoulder-girdle, left  Shoulder-girdle, right  Upper arm left  Upper arm right  Lower arm left  Lower arm right  Lower leg, left  Lower leg, right    Symptom severity score = 6  Fatigue =3   Waking Unrefreshed =3  Cognitive Symptoms= denies      5/4/22:   End of Jan 2022 developed L knee w some swelling. She saw an Orthopedic surgeon at [facility], had imaging & was told she had arthritis in L knee & had steroid injection that helped for x 2 months.  She has had minor trauma to that knee previously. Had fallen on it several times w grandkids  Subsequently, hands began bothering her w numbness & tingling.  Orthopedics said she had shawn CTS; also saw Dr. Garrison in Dr. Chin's office who confirmed & injected both wrists--they haven't hurt too much  since.  Last month developed more arthralgia. Stiff in AM up to 1-2 hrs. Shoulders began hurting first, then both knees, some in elbows and wrists.  Shoulders hurt more in AM & has trouble lifting them.  Has had tendinitis in her shoulders in the past. Pain/stiffness Improves through use but never totally goes away. Ibuprofen & aleve helped a bit. Combining w acetaminophen also helps a bit.   But knees still hurt going up stairs, walking; gelling easily. Icy hot helps more than aspercreme.  No CTD in family  Current Outpatient Medications   Medication Sig Dispense Refill    anastrozole (ARIMIDEX) 1 mg Tab Take 1 mg by mouth once daily.      aspirin (ECOTRIN) 81 MG EC tablet Take 81 mg by mouth. 1 Tablet, Delayed Release (E.C.) Oral Every day      ibuprofen (ADVIL,MOTRIN) 200 MG tablet Take 400 mg by mouth 3 (three) times daily.      loratadine (CLARITIN) 10 mg tablet Take 10 mg by mouth once daily.      metoprolol succinate (TOPROL-XL) 25 MG 24 hr tablet Take 1 tablet (25 mg total) by mouth once daily. 90 tablet 1    naproxen sodium (ANAPROX) 220 MG tablet Take 220 mg by mouth 3 (three) times daily with meals.      rosuvastatin (CRESTOR) 40 MG Tab Take 1 tablet (40 mg total) by mouth every evening. 90 tablet 1     No current facility-administered medications for this visit.         Review of patient's allergies indicates:   Allergen Reactions    Sulfa (sulfonamide antibiotics)      Hives    Other reaction(s): Hives       Review of Systems   Constitutional: Positive for fatigue. Negative for fever and unexpected weight change.   HENT: Negative for dental problem, mouth sores, tinnitus and trouble swallowing.    Eyes: Negative.  Negative for redness, itching and visual disturbance.   Respiratory: Negative.  Negative for cough and shortness of breath.    Cardiovascular: Negative.  Negative for chest pain, palpitations (in past) and leg swelling.   Gastrointestinal: Negative.  Negative for abdominal pain, anal  bleeding, blood in stool, constipation, diarrhea and nausea.   Endocrine: Negative.  Negative for cold intolerance and heat intolerance.   Genitourinary: Negative.  Negative for dysuria, frequency, menstrual problem, pelvic pain and urgency.   Musculoskeletal: Positive for arthralgias. Negative for gait problem, joint swelling (feels shoulders and knees), myalgias and neck pain (not too bad; ).   Skin: Negative.  Negative for color change and rash.        Urticaria before 2017 requiring steroids.  No psoriasis.   Allergic/Immunologic: Negative for food allergies.   Neurological: Negative.  Negative for dizziness, tremors, seizures, syncope, facial asymmetry, weakness, light-headedness, numbness (in past in fingers) and headaches.   Hematological: Negative for adenopathy. Does not bruise/bleed easily.   Psychiatric/Behavioral: Positive for sleep disturbance (falling & staying; shoulders & knees hurt at night.). Negative for decreased concentration and suicidal ideas. The patient is not nervous/anxious.        Past Medical History:   Diagnosis Date    Angioedema 2016    Coronary artery disease     Hypertension     Obesity     Urticaria        Past Surgical History:   Procedure Laterality Date     SECTION      COLONOSCOPY N/A 2015    Procedure: COLONOSCOPY;  Surgeon: Harris Guillen MD;  Location: [address]);  Service: Endoscopy;  Laterality: N/A;    s/p LAD coated  2009    s/p LAD coated stent  2010    TONSILLECTOMY         Family History   Problem Relation Age of Onset    Heart disease Mother     Diabetes Mother     Stroke Mother     Diabetes Father     Urticaria Son     Breast cancer Neg Hx     Ovarian cancer Neg Hx     Vaginal cancer Neg Hx     Endometrial cancer Neg Hx     Cervical cancer Neg Hx        Social History     Tobacco Use    Smoking status: Never Smoker    Smokeless tobacco: Never Used   Substance Use Topics    Alcohol use: No    Drug  use: No   Retired    Objective:     BP (!) 149/74   Pulse (!) 52   Wt 103.1 kg (227 lb 4.7 oz)   BMI 36.69 kg/m²    Accompanied by   Physical Exam   Constitutional: She is oriented to person, place, and time. No distress.   HENT:   Head: Normocephalic and atraumatic.   Mouth/Throat: Oropharynx is clear and moist. No oropharyngeal exudate.   No facial rashes  Parotids not enlarged  No oral ulcers   Eyes: Pupils are equal, round, and reactive to light. Conjunctivae are normal. Right eye exhibits no discharge. Left eye exhibits no discharge. No scleral icterus.   Neck: No JVD present. No tracheal deviation present. No thyromegaly present.   Cardiovascular: Normal rate, regular rhythm and normal heart sounds. Exam reveals no gallop and no friction rub.   No murmur heard.  Pulmonary/Chest: Effort normal and breath sounds normal. No respiratory distress. She has no wheezes. She has no rales. She exhibits no tenderness.   Abdominal: Soft. Bowel sounds are normal. She exhibits no distension and no mass. There is no splenomegaly or hepatomegaly. There is no abdominal tenderness. There is no rebound and no guarding.   Musculoskeletal:         General: Tenderness (traps/deltoids 4/5 TTP; hands diffusely) present. Normal range of motion.      Cervical back: Neck supple.      Comments: No synovitis anywhere.  TTP diffusely hands (dorsa); wrists;  Decrease active abduction both shoulders but appears improved from last visit.        Lymphadenopathy:     She has no cervical adenopathy.   Neurological: She is alert and oriented to person, place, and time. She has normal reflexes. No cranial nerve deficit. Gait normal.   Proximal and distal muscle strength 5/5.   Skin: Skin is warm and dry. No rash noted. She is not diaphoretic.   Psychiatric: Mood, memory, affect and judgment normal.   Vitals reviewed.    5/4/22: ESR 29 (36); CRP <0.3; RF/CCP neg; vit D 34;    5/4/22: Bilateral shoulders: personally viewed: mild OA  changes;   5/4/22: Bilateral knees: personally viewed: mild OA changes.     Assessment:   Joint pain    OA +/- CSS   Doubt RA    Bilateral hands/wrists   Bilateral knees   Bilateral shoulder    Bilateral adhesive capsulitis    On anastrozole     But no MSK improvement on holding it.  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  Joint pain likely combination of OA + CSS  In an abundance of caution will repeat labs, imaging & get MRI of hands.  Patient will contact us after MRI for disposition.      RTC prn    CC: Breonna Multani MD      Addendum: 6/28/22: Bilateral hands: personally viewed: minimal OA otherwise ok;

## 2022-06-28 ENCOUNTER — OFFICE VISIT (OUTPATIENT)
Dept: RHEUMATOLOGY | Facility: CLINIC | Age: 62
End: 2022-06-28
Payer: MEDICARE

## 2022-06-28 ENCOUNTER — HOSPITAL ENCOUNTER (OUTPATIENT)
Dept: RADIOLOGY | Facility: HOSPITAL | Age: 62
Discharge: HOME | End: 2022-06-28
Attending: INTERNAL MEDICINE
Payer: MEDICARE

## 2022-06-28 VITALS
SYSTOLIC BLOOD PRESSURE: 149 MMHG | HEART RATE: 52 BPM | DIASTOLIC BLOOD PRESSURE: 74 MMHG | BODY MASS INDEX: 36.69 KG/M2 | WEIGHT: 227.31 LBS

## 2022-06-28 DIAGNOSIS — M79.642 PAIN IN BOTH HANDS: ICD-10-CM

## 2022-06-28 DIAGNOSIS — E66.9 OBESITY (BMI 30-39.9): ICD-10-CM

## 2022-06-28 DIAGNOSIS — M79.642 BILATERAL HAND PAIN: ICD-10-CM

## 2022-06-28 DIAGNOSIS — M75.01 ADHESIVE CAPSULITIS OF BOTH SHOULDERS: ICD-10-CM

## 2022-06-28 DIAGNOSIS — M25.50 PAIN IN JOINT INVOLVING MULTIPLE SITES: Primary | ICD-10-CM

## 2022-06-28 DIAGNOSIS — M79.641 BILATERAL HAND PAIN: ICD-10-CM

## 2022-06-28 DIAGNOSIS — M79.641 PAIN IN BOTH HANDS: ICD-10-CM

## 2022-06-28 DIAGNOSIS — M75.02 ADHESIVE CAPSULITIS OF BOTH SHOULDERS: ICD-10-CM

## 2022-06-28 PROCEDURE — 99214 OFFICE O/P EST MOD 30 MIN: CPT | Mod: S$PBB,,, | Performed by: INTERNAL MEDICINE

## 2022-06-28 PROCEDURE — 73130 X-RAY EXAM OF HAND: CPT | Mod: TC,50

## 2022-06-28 PROCEDURE — 99999 PR PBB SHADOW E&M-EST. PATIENT-LVL IV: CPT | Mod: PBBFAC,,, | Performed by: INTERNAL MEDICINE

## 2022-06-28 PROCEDURE — 99999 PR PBB SHADOW E&M-EST. PATIENT-LVL IV: ICD-10-PCS | Mod: PBBFAC,,, | Performed by: INTERNAL MEDICINE

## 2022-06-28 PROCEDURE — 99214 OFFICE O/P EST MOD 30 MIN: CPT | Mod: PBBFAC | Performed by: INTERNAL MEDICINE

## 2022-06-28 PROCEDURE — 99214 PR OFFICE/OUTPT VISIT, EST, LEVL IV, 30-39 MIN: ICD-10-PCS | Mod: S$PBB,,, | Performed by: INTERNAL MEDICINE

## 2022-06-28 PROCEDURE — 73130 XR HAND COMPLETE 3 VIEWS BILATERAL: ICD-10-PCS | Mod: 26,50,, | Performed by: RADIOLOGY

## 2022-06-28 PROCEDURE — 73130 X-RAY EXAM OF HAND: CPT | Mod: 26,50,, | Performed by: RADIOLOGY

## 2022-06-30 ENCOUNTER — PATIENT MESSAGE (OUTPATIENT)
Dept: RHEUMATOLOGY | Facility: CLINIC | Age: 62
End: 2022-06-30
Payer: COMMERCIAL

## 2022-08-24 ENCOUNTER — PATIENT MESSAGE (OUTPATIENT)
Dept: INTERNAL MEDICINE | Facility: CLINIC | Age: 62
End: 2022-08-24
Payer: COMMERCIAL

## 2022-10-10 ENCOUNTER — PATIENT MESSAGE (OUTPATIENT)
Dept: INTERNAL MEDICINE | Facility: CLINIC | Age: 62
End: 2022-10-10
Payer: COMMERCIAL

## 2023-01-18 ENCOUNTER — PATIENT MESSAGE (OUTPATIENT)
Dept: ADMINISTRATIVE | Facility: HOSPITAL | Age: 63
End: 2023-01-18
Payer: COMMERCIAL

## 2023-02-01 ENCOUNTER — TELEPHONE (OUTPATIENT)
Dept: PRIMARY CARE CLINIC | Facility: CLINIC | Age: 63
End: 2023-02-01
Payer: COMMERCIAL

## 2023-02-01 DIAGNOSIS — R89.9 ABNORMAL LABORATORY TEST: ICD-10-CM

## 2023-02-01 DIAGNOSIS — I10 PRIMARY HYPERTENSION: ICD-10-CM

## 2023-02-01 DIAGNOSIS — R73.09 ELEVATED HEMOGLOBIN A1C: Primary | ICD-10-CM

## 2023-02-01 NOTE — TELEPHONE ENCOUNTER
Informed pt that lab orders are in and she can go to the lab one week prior to the appt. In May. Pt verbalized understanding.

## 2023-03-22 ENCOUNTER — PATIENT MESSAGE (OUTPATIENT)
Dept: ADMINISTRATIVE | Facility: HOSPITAL | Age: 63
End: 2023-03-22
Payer: COMMERCIAL

## 2023-03-23 ENCOUNTER — PATIENT OUTREACH (OUTPATIENT)
Dept: ADMINISTRATIVE | Facility: HOSPITAL | Age: 63
End: 2023-03-23
Payer: COMMERCIAL

## 2023-03-24 ENCOUNTER — PATIENT OUTREACH (OUTPATIENT)
Dept: ADMINISTRATIVE | Facility: HOSPITAL | Age: 63
End: 2023-03-24
Payer: COMMERCIAL

## 2023-04-05 ENCOUNTER — OFFICE VISIT (OUTPATIENT)
Dept: PRIMARY CARE CLINIC | Facility: CLINIC | Age: 63
End: 2023-04-05
Attending: FAMILY MEDICINE
Payer: MEDICARE

## 2023-04-05 ENCOUNTER — LAB VISIT (OUTPATIENT)
Dept: LAB | Facility: HOSPITAL | Age: 63
End: 2023-04-05
Attending: FAMILY MEDICINE
Payer: MEDICARE

## 2023-04-05 ENCOUNTER — PATIENT MESSAGE (OUTPATIENT)
Dept: PRIMARY CARE CLINIC | Facility: CLINIC | Age: 63
End: 2023-04-05
Payer: MEDICARE

## 2023-04-05 VITALS
OXYGEN SATURATION: 98 % | HEIGHT: 66 IN | WEIGHT: 218.25 LBS | BODY MASS INDEX: 35.08 KG/M2 | DIASTOLIC BLOOD PRESSURE: 76 MMHG | SYSTOLIC BLOOD PRESSURE: 118 MMHG | HEART RATE: 70 BPM

## 2023-04-05 DIAGNOSIS — R73.09 ELEVATED HEMOGLOBIN A1C: ICD-10-CM

## 2023-04-05 DIAGNOSIS — R20.2 NUMBNESS AND TINGLING OF LEFT SIDE OF FACE: ICD-10-CM

## 2023-04-05 DIAGNOSIS — R20.2 NUMBNESS AND TINGLING IN BOTH HANDS: ICD-10-CM

## 2023-04-05 DIAGNOSIS — M48.02 CERVICAL SPINAL STENOSIS: Primary | ICD-10-CM

## 2023-04-05 DIAGNOSIS — R20.2 NUMBNESS AND TINGLING OF LEFT SIDE OF FACE: Primary | ICD-10-CM

## 2023-04-05 DIAGNOSIS — R20.0 NUMBNESS AND TINGLING IN BOTH HANDS: ICD-10-CM

## 2023-04-05 DIAGNOSIS — R29.818 OTHER SYMPTOMS AND SIGNS INVOLVING THE NERVOUS SYSTEM: ICD-10-CM

## 2023-04-05 DIAGNOSIS — R20.0 NUMBNESS AND TINGLING OF LEFT SIDE OF FACE: Primary | ICD-10-CM

## 2023-04-05 DIAGNOSIS — R20.0 NUMBNESS AND TINGLING OF LEFT SIDE OF FACE: ICD-10-CM

## 2023-04-05 DIAGNOSIS — I10 PRIMARY HYPERTENSION: ICD-10-CM

## 2023-04-05 DIAGNOSIS — E66.01 MORBID OBESITY: ICD-10-CM

## 2023-04-05 DIAGNOSIS — R89.9 ABNORMAL LABORATORY TEST: ICD-10-CM

## 2023-04-05 DIAGNOSIS — E66.9 OBESITY (BMI 30-39.9): Chronic | ICD-10-CM

## 2023-04-05 DIAGNOSIS — E78.5 DYSLIPIDEMIA: Chronic | ICD-10-CM

## 2023-04-05 LAB
ALBUMIN SERPL BCP-MCNC: 3.9 G/DL (ref 3.5–5.2)
ALP SERPL-CCNC: 115 U/L (ref 55–135)
ALT SERPL W/O P-5'-P-CCNC: 34 U/L (ref 10–44)
ANION GAP SERPL CALC-SCNC: 10 MMOL/L (ref 8–16)
AST SERPL-CCNC: 30 U/L (ref 10–40)
BASOPHILS # BLD AUTO: 0 K/UL (ref 0–0.2)
BASOPHILS NFR BLD: 0 % (ref 0–1.9)
BILIRUB SERPL-MCNC: 0.4 MG/DL (ref 0.1–1)
BUN SERPL-MCNC: 9 MG/DL (ref 8–23)
CALCIUM SERPL-MCNC: 9.4 MG/DL (ref 8.7–10.5)
CHLORIDE SERPL-SCNC: 106 MMOL/L (ref 95–110)
CHOLEST SERPL-MCNC: 118 MG/DL (ref 120–199)
CHOLEST/HDLC SERPL: 2.4 {RATIO} (ref 2–5)
CO2 SERPL-SCNC: 25 MMOL/L (ref 23–29)
CREAT SERPL-MCNC: 0.7 MG/DL (ref 0.5–1.4)
DIFFERENTIAL METHOD: NORMAL
EOSINOPHIL # BLD AUTO: 0 K/UL (ref 0–0.5)
EOSINOPHIL NFR BLD: 0 % (ref 0–8)
ERYTHROCYTE [DISTWIDTH] IN BLOOD BY AUTOMATED COUNT: 13.8 % (ref 11.5–14.5)
EST. GFR  (NO RACE VARIABLE): >60 ML/MIN/1.73 M^2
ESTIMATED AVG GLUCOSE: 120 MG/DL (ref 68–131)
GLUCOSE SERPL-MCNC: 97 MG/DL (ref 70–110)
HBA1C MFR BLD: 5.8 % (ref 4–5.6)
HCT VFR BLD AUTO: 46.5 % (ref 37–48.5)
HDLC SERPL-MCNC: 49 MG/DL (ref 40–75)
HDLC SERPL: 41.5 % (ref 20–50)
HGB BLD-MCNC: 14.9 G/DL (ref 12–16)
IMM GRANULOCYTES # BLD AUTO: 0.01 K/UL (ref 0–0.04)
IMM GRANULOCYTES NFR BLD AUTO: 0.1 % (ref 0–0.5)
LDLC SERPL CALC-MCNC: 58 MG/DL (ref 63–159)
LYMPHOCYTES # BLD AUTO: 1.7 K/UL (ref 1–4.8)
LYMPHOCYTES NFR BLD: 24.2 % (ref 18–48)
MCH RBC QN AUTO: 28.9 PG (ref 27–31)
MCHC RBC AUTO-ENTMCNC: 32 G/DL (ref 32–36)
MCV RBC AUTO: 90 FL (ref 82–98)
MONOCYTES # BLD AUTO: 0.6 K/UL (ref 0.3–1)
MONOCYTES NFR BLD: 8 % (ref 4–15)
NEUTROPHILS # BLD AUTO: 4.6 K/UL (ref 1.8–7.7)
NEUTROPHILS NFR BLD: 67.7 % (ref 38–73)
NONHDLC SERPL-MCNC: 69 MG/DL
NRBC BLD-RTO: 0 /100 WBC
PLATELET # BLD AUTO: 208 K/UL (ref 150–450)
PMV BLD AUTO: 11.5 FL (ref 9.2–12.9)
POTASSIUM SERPL-SCNC: 4.2 MMOL/L (ref 3.5–5.1)
PROT SERPL-MCNC: 7.3 G/DL (ref 6–8.4)
RBC # BLD AUTO: 5.15 M/UL (ref 4–5.4)
SODIUM SERPL-SCNC: 141 MMOL/L (ref 136–145)
TRIGL SERPL-MCNC: 55 MG/DL (ref 30–150)
TSH SERPL DL<=0.005 MIU/L-ACNC: 0.61 UIU/ML (ref 0.4–4)
WBC # BLD AUTO: 6.85 K/UL (ref 3.9–12.7)

## 2023-04-05 PROCEDURE — 80061 LIPID PANEL: CPT | Performed by: FAMILY MEDICINE

## 2023-04-05 PROCEDURE — 36415 COLL VENOUS BLD VENIPUNCTURE: CPT | Mod: PN | Performed by: FAMILY MEDICINE

## 2023-04-05 PROCEDURE — 99999 PR PBB SHADOW E&M-EST. PATIENT-LVL III: CPT | Mod: PBBFAC,,, | Performed by: FAMILY MEDICINE

## 2023-04-05 PROCEDURE — 84443 ASSAY THYROID STIM HORMONE: CPT | Performed by: FAMILY MEDICINE

## 2023-04-05 PROCEDURE — 86787 VARICELLA-ZOSTER ANTIBODY: CPT | Performed by: FAMILY MEDICINE

## 2023-04-05 PROCEDURE — 99999 PR PBB SHADOW E&M-EST. PATIENT-LVL III: ICD-10-PCS | Mod: PBBFAC,,, | Performed by: FAMILY MEDICINE

## 2023-04-05 PROCEDURE — 85025 COMPLETE CBC W/AUTO DIFF WBC: CPT | Performed by: FAMILY MEDICINE

## 2023-04-05 PROCEDURE — 99213 OFFICE O/P EST LOW 20 MIN: CPT | Mod: PBBFAC,PN | Performed by: FAMILY MEDICINE

## 2023-04-05 PROCEDURE — 80053 COMPREHEN METABOLIC PANEL: CPT | Performed by: FAMILY MEDICINE

## 2023-04-05 PROCEDURE — 83036 HEMOGLOBIN GLYCOSYLATED A1C: CPT | Performed by: FAMILY MEDICINE

## 2023-04-05 PROCEDURE — 99215 OFFICE O/P EST HI 40 MIN: CPT | Mod: S$PBB,,, | Performed by: FAMILY MEDICINE

## 2023-04-05 PROCEDURE — 99215 PR OFFICE/OUTPT VISIT, EST, LEVL V, 40-54 MIN: ICD-10-PCS | Mod: S$PBB,,, | Performed by: FAMILY MEDICINE

## 2023-04-05 RX ORDER — GABAPENTIN 600 MG/1
600 TABLET ORAL NIGHTLY PRN
Qty: 30 TABLET | Refills: 0 | Status: SHIPPED | OUTPATIENT
Start: 2023-04-05 | End: 2023-05-04

## 2023-04-05 RX ORDER — ALBUTEROL SULFATE 90 UG/1
AEROSOL, METERED RESPIRATORY (INHALATION)
COMMUNITY
Start: 2023-04-01

## 2023-04-05 RX ORDER — EPINEPHRINE 0.3 MG/.3ML
INJECTION SUBCUTANEOUS
COMMUNITY
Start: 2023-02-10

## 2023-04-06 LAB
VARICELLA INTERPRETATION: POSITIVE
VARICELLA ZOSTER IGG: 1351 AU/ML

## 2023-04-11 PROBLEM — E66.01 MORBID OBESITY: Status: ACTIVE | Noted: 2023-04-11

## 2023-04-11 NOTE — PROGRESS NOTES
Subjective:       Patient ID: Farideh Andre is a 63 y.o. female.    Chief Complaint: Generalized Body Aches, Numbness, Shoulder Pain, and Hand Pain    Pt presents today for annual. Also c/o numbness and tingling in her hands as well as her left side of face. Does have a significant h/o cervical issues that were managed in past by Dr Díaz. Was told to do surgery and opted not too. Pt also on high risk med for breast CA remission. Could also be SEs of this medicine    Shoulder Pain   The pain is present in the left hand, right arm, left arm and right hand. The problem occurs every several days. The problem has been gradually worsening. The pain is at a severity of 4/10. Associated symptoms include numbness and tingling. The symptoms are aggravated by lying down. She has tried OTC pain meds, OTC ointments, rest, NSAIDS and heat for the symptoms. The treatment provided mild relief. Family history includes arthritis. There is no history of diabetes or migraines.   Hand Pain   Associated symptoms include numbness and tingling.   Review of Systems   Constitutional:  Positive for activity change.   Musculoskeletal:  Positive for arthralgias, back pain, joint swelling, neck pain and neck stiffness.   Neurological:  Positive for tingling and numbness.   All other systems reviewed and are negative.      Objective:      Physical Exam  Vitals reviewed.   Constitutional:       General: She is not in acute distress.     Appearance: Normal appearance. She is well-developed and normal weight. She is not ill-appearing, toxic-appearing or diaphoretic.   HENT:      Head: Normocephalic and atraumatic.      Right Ear: Tympanic membrane, ear canal and external ear normal. There is no impacted cerumen.      Left Ear: Tympanic membrane, ear canal and external ear normal. There is no impacted cerumen.      Nose: Nose normal.      Mouth/Throat:      Pharynx: No oropharyngeal exudate or posterior oropharyngeal erythema.   Eyes:       Extraocular Movements: Extraocular movements intact.      Conjunctiva/sclera: Conjunctivae normal.      Pupils: Pupils are equal, round, and reactive to light.   Neck:      Thyroid: No thyromegaly.   Cardiovascular:      Rate and Rhythm: Normal rate and regular rhythm.      Pulses: Normal pulses.      Heart sounds: Normal heart sounds. No murmur heard.  Pulmonary:      Effort: Pulmonary effort is normal. No respiratory distress.      Breath sounds: Normal breath sounds. No stridor. No rhonchi or rales.   Chest:      Chest wall: No tenderness.   Abdominal:      General: Bowel sounds are normal. There is no distension.      Palpations: Abdomen is soft. There is no mass.      Tenderness: There is no abdominal tenderness. There is no guarding or rebound.   Musculoskeletal:         General: No tenderness. Normal range of motion.      Cervical back: Normal range of motion and neck supple.      Right lower leg: No edema.      Left lower leg: No edema.      Comments: Tinnels and phalens neg.  3/5. Pos radial pulses. FROM of neck passively    Cn 2-12 grossly intact     Lymphadenopathy:      Cervical: No cervical adenopathy.   Skin:     General: Skin is warm and dry.      Findings: No erythema.   Neurological:      General: No focal deficit present.      Mental Status: She is alert and oriented to person, place, and time. Mental status is at baseline.      Cranial Nerves: No cranial nerve deficit.      Sensory: No sensory deficit.      Motor: No weakness.      Coordination: Coordination normal.      Gait: Gait normal.      Deep Tendon Reflexes: Reflexes are normal and symmetric. Reflexes normal.   Psychiatric:         Mood and Affect: Mood normal.         Behavior: Behavior normal.         Thought Content: Thought content normal.         Judgment: Judgment normal.       Assessment:       1. Numbness and tingling of left side of face    2. Primary hypertension    3. Dyslipidemia    4. Numbness and tingling in both hands     5. Other symptoms and signs involving the nervous system        Plan:   1. Numbness and tingling of left side of face  -     CT Head Without Contrast; Future; Expected date: 04/05/2023  -     VARICELLA ZOSTER ANTIBODY, IGG; Future; Expected date: 04/05/2023    2. Primary hypertension    3. Dyslipidemia    4. Numbness and tingling in both hands  -     X-Ray Cervical Spine Complete 5 view; Future; Expected date: 04/05/2023  -     gabapentin (NEURONTIN) 600 MG tablet; Take 1 tablet (600 mg total) by mouth nightly as needed (insomnia).  Dispense: 30 tablet; Refill: 0  -     VARICELLA ZOSTER ANTIBODY, IGG; Future; Expected date: 04/05/2023    5. Other symptoms and signs involving the nervous system  -     CT Head Without Contrast; Future; Expected date: 04/05/2023    Unclear suman with facial numbness and tingling will need to assess brain to ensure that this is not an issue coming from the brain. If not, pt should see her neurospine doc regarding her cervical spine issues from the past. Pt amenable  ED prompts d/w pt  Pt also denies that she ever has had varicella. Will check titers today  HTN controlled  Obesity: lifestyle mods d./w pt  Health Maintenance reviewed/updated.

## 2023-04-12 ENCOUNTER — HOSPITAL ENCOUNTER (OUTPATIENT)
Dept: RADIOLOGY | Facility: OTHER | Age: 63
Discharge: HOME | End: 2023-04-12
Attending: FAMILY MEDICINE
Payer: MEDICARE

## 2023-04-12 DIAGNOSIS — R20.2 NUMBNESS AND TINGLING OF LEFT SIDE OF FACE: ICD-10-CM

## 2023-04-12 DIAGNOSIS — R20.0 NUMBNESS AND TINGLING OF LEFT SIDE OF FACE: ICD-10-CM

## 2023-04-12 DIAGNOSIS — R29.818 OTHER SYMPTOMS AND SIGNS INVOLVING THE NERVOUS SYSTEM: ICD-10-CM

## 2023-04-12 PROCEDURE — 70450 CT HEAD WITHOUT CONTRAST: ICD-10-PCS | Mod: 26,,, | Performed by: RADIOLOGY

## 2023-04-12 PROCEDURE — 70450 CT HEAD/BRAIN W/O DYE: CPT | Mod: TC

## 2023-04-12 PROCEDURE — 70450 CT HEAD/BRAIN W/O DYE: CPT | Mod: 26,,, | Performed by: RADIOLOGY

## 2023-04-21 ENCOUNTER — OFFICE VISIT (OUTPATIENT)
Dept: NEUROLOGY | Facility: CLINIC | Age: 63
End: 2023-04-21
Payer: MEDICARE

## 2023-04-21 ENCOUNTER — LAB VISIT (OUTPATIENT)
Dept: LAB | Facility: HOSPITAL | Age: 63
End: 2023-04-21
Payer: MEDICARE

## 2023-04-21 VITALS
WEIGHT: 219.81 LBS | HEART RATE: 60 BPM | DIASTOLIC BLOOD PRESSURE: 65 MMHG | HEIGHT: 66 IN | SYSTOLIC BLOOD PRESSURE: 119 MMHG | BODY MASS INDEX: 35.32 KG/M2

## 2023-04-21 DIAGNOSIS — G60.9 HEREDITARY AND IDIOPATHIC NEUROPATHY, UNSPECIFIED: ICD-10-CM

## 2023-04-21 DIAGNOSIS — G62.9 NEUROPATHY: Primary | ICD-10-CM

## 2023-04-21 DIAGNOSIS — G62.9 NEUROPATHY: ICD-10-CM

## 2023-04-21 LAB
CK SERPL-CCNC: 109 U/L (ref 20–180)
RPR SER QL: NORMAL

## 2023-04-21 PROCEDURE — 86592 SYPHILIS TEST NON-TREP QUAL: CPT | Performed by: PHYSICIAN ASSISTANT

## 2023-04-21 PROCEDURE — 84207 ASSAY OF VITAMIN B-6: CPT | Performed by: PHYSICIAN ASSISTANT

## 2023-04-21 PROCEDURE — 83921 ORGANIC ACID SINGLE QUANT: CPT | Performed by: PHYSICIAN ASSISTANT

## 2023-04-21 PROCEDURE — 82607 VITAMIN B-12: CPT | Performed by: PHYSICIAN ASSISTANT

## 2023-04-21 PROCEDURE — 82300 ASSAY OF CADMIUM: CPT | Performed by: PHYSICIAN ASSISTANT

## 2023-04-21 PROCEDURE — 82550 ASSAY OF CK (CPK): CPT | Performed by: PHYSICIAN ASSISTANT

## 2023-04-21 PROCEDURE — 84425 ASSAY OF VITAMIN B-1: CPT | Performed by: PHYSICIAN ASSISTANT

## 2023-04-21 PROCEDURE — 99999 PR PBB SHADOW E&M-EST. PATIENT-LVL III: CPT | Mod: PBBFAC,,, | Performed by: PHYSICIAN ASSISTANT

## 2023-04-21 PROCEDURE — 84252 ASSAY OF VITAMIN B-2: CPT | Performed by: PHYSICIAN ASSISTANT

## 2023-04-21 PROCEDURE — 87522 HEPATITIS C REVRS TRNSCRPJ: CPT | Performed by: PHYSICIAN ASSISTANT

## 2023-04-21 PROCEDURE — 99999 PR PBB SHADOW E&M-EST. PATIENT-LVL III: ICD-10-PCS | Mod: PBBFAC,,, | Performed by: PHYSICIAN ASSISTANT

## 2023-04-21 PROCEDURE — 99205 PR OFFICE/OUTPT VISIT, NEW, LEVL V, 60-74 MIN: ICD-10-PCS | Mod: S$PBB,,, | Performed by: PHYSICIAN ASSISTANT

## 2023-04-21 PROCEDURE — 99213 OFFICE O/P EST LOW 20 MIN: CPT | Mod: PBBFAC | Performed by: PHYSICIAN ASSISTANT

## 2023-04-21 PROCEDURE — 99205 OFFICE O/P NEW HI 60 MIN: CPT | Mod: S$PBB,,, | Performed by: PHYSICIAN ASSISTANT

## 2023-04-21 RX ORDER — IBUPROFEN 600 MG/1
600 TABLET ORAL 3 TIMES DAILY
COMMUNITY
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
## 2023-04-22 LAB
ARSENIC BLD-MCNC: <1 NG/ML
CADMIUM BLD-MCNC: 0.2 NG/ML
CITY: NORMAL
COUNTY: NORMAL
GUARDIAN FIRST NAME: NORMAL
GUARDIAN LAST NAME: NORMAL
HOME PHONE: NORMAL
LEAD BLD-MCNC: 3 MCG/DL
MERCURY BLD-MCNC: <1 NG/ML
RACE: NORMAL
STATE: NORMAL
STREET ADDRESS: NORMAL
VENOUS/CAPILLARY: NORMAL
VIT B12 SERPL-MCNC: 277 NG/L (ref 180–914)
ZIP: NORMAL

## 2023-04-24 DIAGNOSIS — E53.8 B12 DEFICIENCY: Primary | ICD-10-CM

## 2023-04-24 LAB
HCV RNA SERPL QL NAA+PROBE: NOT DETECTED
HCV RNA SPEC NAA+PROBE-ACNC: <12 IU/ML

## 2023-04-24 RX ORDER — CYANOCOBALAMIN 1000 UG/ML
INJECTION, SOLUTION INTRAMUSCULAR; SUBCUTANEOUS
Qty: 1 ML | Refills: 5 | Status: SHIPPED | OUTPATIENT
Start: 2023-04-24 | End: 2023-09-21

## 2023-04-25 LAB
METHYLMALONATE SERPL-SCNC: 0.13 NMOL/ML
PYRIDOXAL SERPL-MCNC: 11 UG/L (ref 5–50)
VIT B2 SERPL-MCNC: 9 MCG/L (ref 1–19)

## 2023-04-26 LAB — VIT B1 BLD-MCNC: 76 UG/L (ref 38–122)

## 2023-05-04 DIAGNOSIS — R20.0 NUMBNESS AND TINGLING IN BOTH HANDS: ICD-10-CM

## 2023-05-04 DIAGNOSIS — R20.2 NUMBNESS AND TINGLING IN BOTH HANDS: ICD-10-CM

## 2023-05-04 RX ORDER — GABAPENTIN 600 MG/1
TABLET ORAL
Qty: 30 TABLET | Refills: 0 | Status: SHIPPED | OUTPATIENT
Start: 2023-05-04 | End: 2023-06-06

## 2023-05-04 NOTE — TELEPHONE ENCOUNTER
No care due was identified.  [facility] Embedded Care Due Messages. Reference number: [id number].   5/04/2023 11:56:29 AM CDT

## 2023-06-01 DIAGNOSIS — E78.5 DYSLIPIDEMIA: ICD-10-CM

## 2023-06-02 RX ORDER — ROSUVASTATIN CALCIUM 40 MG/1
40 TABLET, COATED ORAL NIGHTLY
Qty: 90 TABLET | Refills: 1 | OUTPATIENT
Start: 2023-06-02

## 2023-06-02 RX ORDER — ROSUVASTATIN CALCIUM 40 MG/1
TABLET, COATED ORAL
Qty: 90 TABLET | Refills: 3 | Status: SHIPPED | OUTPATIENT
Start: 2023-06-02

## 2023-06-02 NOTE — TELEPHONE ENCOUNTER
Refill Decision Note   Farideh Andre  is requesting a refill authorization.  Brief Assessment and Rationale for Refill:  Quick Discontinue     Medication Therapy Plan:         Comments:     Note composed:9:42 AM 06/02/2023

## 2023-06-02 NOTE — TELEPHONE ENCOUNTER
No care due was identified.  [facility] Embedded Care Due Messages. Reference number: [id number].   6/01/2023 10:56:31 PM CDT

## 2023-06-02 NOTE — TELEPHONE ENCOUNTER
Refill Decision Note   Farideh Andre  is requesting a refill authorization.  Brief Assessment and Rationale for Refill:  Approve     Medication Therapy Plan:         Comments:     Note composed:9:41 AM 06/02/2023

## 2023-06-02 NOTE — TELEPHONE ENCOUNTER
No care due was identified.  [facility] Embedded Care Due Messages. Reference number: [id number].   6/01/2023 11:06:58 PM CDT

## 2023-06-06 ENCOUNTER — OFFICE VISIT (OUTPATIENT)
Dept: PRIMARY CARE CLINIC | Facility: CLINIC | Age: 63
End: 2023-06-06
Attending: FAMILY MEDICINE
Payer: MEDICARE

## 2023-06-06 VITALS
HEART RATE: 50 BPM | DIASTOLIC BLOOD PRESSURE: 80 MMHG | WEIGHT: 215.94 LBS | HEIGHT: 66 IN | BODY MASS INDEX: 34.7 KG/M2 | SYSTOLIC BLOOD PRESSURE: 123 MMHG | OXYGEN SATURATION: 97 %

## 2023-06-06 DIAGNOSIS — Z00.00 ANNUAL PHYSICAL EXAM: ICD-10-CM

## 2023-06-06 DIAGNOSIS — I10 PRIMARY HYPERTENSION: Primary | ICD-10-CM

## 2023-06-06 PROCEDURE — 99999 PR PBB SHADOW E&M-EST. PATIENT-LVL III: CPT | Mod: PBBFAC,,, | Performed by: FAMILY MEDICINE

## 2023-06-06 PROCEDURE — 99213 OFFICE O/P EST LOW 20 MIN: CPT | Mod: PBBFAC,PN | Performed by: FAMILY MEDICINE

## 2023-06-06 PROCEDURE — 99999 PR PBB SHADOW E&M-EST. PATIENT-LVL III: ICD-10-PCS | Mod: PBBFAC,,, | Performed by: FAMILY MEDICINE

## 2023-06-06 PROCEDURE — 99214 PR OFFICE/OUTPT VISIT, EST, LEVL IV, 30-39 MIN: ICD-10-PCS | Mod: S$PBB,,, | Performed by: FAMILY MEDICINE

## 2023-06-06 PROCEDURE — 99214 OFFICE O/P EST MOD 30 MIN: CPT | Mod: S$PBB,,, | Performed by: FAMILY MEDICINE

## 2023-06-06 RX ORDER — VIT C/E/ZN/COPPR/LUTEIN/ZEAXAN 250MG-90MG
CAPSULE ORAL
COMMUNITY

## 2023-06-08 ENCOUNTER — PES CALL (OUTPATIENT)
Dept: ADMINISTRATIVE | Facility: CLINIC | Age: 63
End: 2023-06-08
Payer: MEDICARE

## 2023-06-20 ENCOUNTER — TELEPHONE (OUTPATIENT)
Dept: NEUROLOGY | Facility: CLINIC | Age: 63
End: 2023-06-20
Payer: MEDICARE

## 2023-06-20 NOTE — TELEPHONE ENCOUNTER
----- Message from Bharati Ng sent at 6/20/2023  3:53 PM CDT -----  Regarding: Reschedule appt on 7/10/23 at 3pm  Contact: [phone]  Pt called in and is requesting a call back to reschedule appt on 7/10/23 to an early morning appt. Please call pt to further discuss. Thank you

## 2023-07-10 ENCOUNTER — OFFICE VISIT (OUTPATIENT)
Dept: NEUROLOGY | Facility: CLINIC | Age: 63
End: 2023-07-10
Payer: MEDICARE

## 2023-07-10 VITALS
WEIGHT: 220.88 LBS | HEART RATE: 77 BPM | HEIGHT: 65 IN | BODY MASS INDEX: 36.8 KG/M2 | DIASTOLIC BLOOD PRESSURE: 67 MMHG | SYSTOLIC BLOOD PRESSURE: 111 MMHG

## 2023-07-10 DIAGNOSIS — M25.511 ACUTE PAIN OF BOTH SHOULDERS: Primary | ICD-10-CM

## 2023-07-10 DIAGNOSIS — I25.10 CORONARY ARTERY DISEASE WITHOUT ANGINA PECTORIS, UNSPECIFIED VESSEL OR LESION TYPE, UNSPECIFIED WHETHER NATIVE OR TRANSPLANTED HEART: Chronic | ICD-10-CM

## 2023-07-10 DIAGNOSIS — M25.512 ACUTE PAIN OF BOTH SHOULDERS: Primary | ICD-10-CM

## 2023-07-10 PROCEDURE — 99999 PR PBB SHADOW E&M-EST. PATIENT-LVL IV: CPT | Mod: PBBFAC,,, | Performed by: PHYSICIAN ASSISTANT

## 2023-07-10 PROCEDURE — 99214 PR OFFICE/OUTPT VISIT, EST, LEVL IV, 30-39 MIN: ICD-10-PCS | Mod: S$PBB,,, | Performed by: PHYSICIAN ASSISTANT

## 2023-07-10 PROCEDURE — 99214 OFFICE O/P EST MOD 30 MIN: CPT | Mod: PBBFAC | Performed by: PHYSICIAN ASSISTANT

## 2023-07-10 PROCEDURE — 99999 PR PBB SHADOW E&M-EST. PATIENT-LVL IV: ICD-10-PCS | Mod: PBBFAC,,, | Performed by: PHYSICIAN ASSISTANT

## 2023-07-10 PROCEDURE — 99214 OFFICE O/P EST MOD 30 MIN: CPT | Mod: S$PBB,,, | Performed by: PHYSICIAN ASSISTANT
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
## 2023-07-11 ENCOUNTER — TELEPHONE (OUTPATIENT)
Dept: NEUROLOGY | Facility: CLINIC | Age: 63
End: 2023-07-11
Payer: MEDICARE

## 2023-07-11 ENCOUNTER — PATIENT MESSAGE (OUTPATIENT)
Dept: NEUROLOGY | Facility: CLINIC | Age: 63
End: 2023-07-11
Payer: MEDICARE

## 2023-07-11 NOTE — TELEPHONE ENCOUNTER
----- Message from Ekaterina Pizarro sent at 7/11/2023  3:07 PM CDT -----  Regarding: pt advice  Contact: [phone]  Pt stated her pharmacy is not seeing the prescription listed below in their system. Confirmed to pt that it was sent in April with 5 refills. But pt stated the pharmacy is saying they don't see it. Pls call to discuss.   insulin syringes, disposable, 1 mL Syrg    ..  [id number] - [place] [address]  [address] [place] [address]  [place] [address]  Phone: [phone] Fax: [phone]

## 2023-07-12 DIAGNOSIS — E53.8 B12 DEFICIENCY: ICD-10-CM

## 2023-07-17 ENCOUNTER — TELEPHONE (OUTPATIENT)
Dept: NEUROLOGY | Facility: CLINIC | Age: 63
End: 2023-07-17
Payer: MEDICARE

## 2023-07-17 NOTE — TELEPHONE ENCOUNTER
Called patient in regards to rescheduling the EMG Procedure that was previously scheduled and missed on June 8th.  Patient answered the phone and after I introduced myself she said she would have to call me back and hung up the telephone.

## 2023-08-26 NOTE — PROGRESS NOTES
Subjective:       Patient ID: Farideh Andre is a 63 y.o. female.    Chief Complaint: Annual Exam    PATIENT PRESENTS NO COMPLAINTS NEEDS LAB      Review of Systems   Constitutional:  Negative for activity change, appetite change, chills, fatigue and fever.   HENT:  Negative for congestion, ear pain, sinus pressure, sore throat and trouble swallowing.    Eyes:  Negative for photophobia, pain and visual disturbance.   Respiratory:  Negative for apnea, cough, chest tightness, shortness of breath and wheezing.    Cardiovascular:  Negative for chest pain, palpitations and leg swelling.   Gastrointestinal:  Negative for abdominal distention, abdominal pain, constipation, diarrhea, nausea and vomiting.   Genitourinary: Negative.    Musculoskeletal:  Negative for back pain, joint swelling and neck pain.   Skin: Negative.    Neurological:  Negative for dizziness, tremors, weakness, numbness and headaches.   Psychiatric/Behavioral:  Negative for behavioral problems, decreased concentration and sleep disturbance. The patient is not nervous/anxious.    All other systems reviewed and are negative.        Objective:      Physical Exam  Vitals reviewed.   Constitutional:       General: She is not in acute distress.     Appearance: Normal appearance. She is well-developed and normal weight. She is not ill-appearing, toxic-appearing or diaphoretic.   HENT:      Head: Normocephalic and atraumatic.      Right Ear: External ear normal.      Left Ear: External ear normal.      Nose: Nose normal.      Mouth/Throat:      Pharynx: No oropharyngeal exudate.   Eyes:      Extraocular Movements: Extraocular movements intact.      Conjunctiva/sclera: Conjunctivae normal.      Pupils: Pupils are equal, round, and reactive to light.   Neck:      Thyroid: No thyromegaly.   Cardiovascular:      Rate and Rhythm: Normal rate and regular rhythm.      Heart sounds: Normal heart sounds. No murmur heard.  Pulmonary:      Effort: Pulmonary effort  is normal. No respiratory distress.      Breath sounds: Normal breath sounds.   Abdominal:      General: Bowel sounds are normal. There is no distension.      Palpations: Abdomen is soft. There is no mass.      Tenderness: There is no abdominal tenderness. There is no guarding or rebound.   Musculoskeletal:         General: No tenderness. Normal range of motion.      Cervical back: Normal range of motion and neck supple.      Right lower leg: No edema.      Left lower leg: No edema.   Lymphadenopathy:      Cervical: No cervical adenopathy.   Skin:     General: Skin is warm and dry.      Findings: No erythema.   Neurological:      General: No focal deficit present.      Mental Status: She is alert and oriented to person, place, and time. Mental status is at baseline.      Deep Tendon Reflexes: Reflexes are normal and symmetric.   Psychiatric:         Mood and Affect: Mood normal.         Behavior: Behavior normal.         Thought Content: Thought content normal.         Judgment: Judgment normal.         Assessment:       1. Primary hypertension        Plan:   1. Primary hypertension    Controlled on meds  Annual PE wnl  Labs reviewed w pt in office today    Health Maintenance reviewed/updated.

## 2023-09-03 DIAGNOSIS — I10 HYPERTENSION, UNSPECIFIED TYPE: ICD-10-CM

## 2023-09-03 NOTE — TELEPHONE ENCOUNTER
No care due was identified.  Health [facility] Embedded Care Due Messages. Reference number: [id number].   9/03/2023 5:44:41 AM CDT

## 2023-09-04 RX ORDER — METOPROLOL SUCCINATE 25 MG/1
TABLET, EXTENDED RELEASE ORAL
Qty: 90 TABLET | Refills: 3 | Status: SHIPPED | OUTPATIENT
Start: 2023-09-04 | End: 2023-11-19

## 2023-09-04 NOTE — TELEPHONE ENCOUNTER
Refill Decision Note   Farideh Andre  is requesting a refill authorization.  Brief Assessment and Rationale for Refill:  Approve     Medication Therapy Plan:         Comments:     Note composed:3:44 AM 09/04/2023

## 2023-09-19 ENCOUNTER — OFFICE VISIT (OUTPATIENT)
Dept: PRIMARY CARE CLINIC | Facility: CLINIC | Age: 63
End: 2023-09-19
Attending: FAMILY MEDICINE
Payer: MEDICARE

## 2023-09-19 VITALS
HEIGHT: 65 IN | SYSTOLIC BLOOD PRESSURE: 134 MMHG | HEART RATE: 57 BPM | OXYGEN SATURATION: 96 % | DIASTOLIC BLOOD PRESSURE: 84 MMHG | BODY MASS INDEX: 35.48 KG/M2 | WEIGHT: 212.94 LBS

## 2023-09-19 DIAGNOSIS — M79.602 LEFT ARM PAIN: ICD-10-CM

## 2023-09-19 DIAGNOSIS — I10 PRIMARY HYPERTENSION: ICD-10-CM

## 2023-09-19 DIAGNOSIS — M47.892 OTHER OSTEOARTHRITIS OF SPINE, CERVICAL REGION: Primary | ICD-10-CM

## 2023-09-19 PROCEDURE — 99214 OFFICE O/P EST MOD 30 MIN: CPT | Mod: S$PBB,,, | Performed by: FAMILY MEDICINE

## 2023-09-19 PROCEDURE — 99999 PR PBB SHADOW E&M-EST. PATIENT-LVL IV: CPT | Mod: PBBFAC,,, | Performed by: FAMILY MEDICINE

## 2023-09-19 PROCEDURE — 99214 PR OFFICE/OUTPT VISIT, EST, LEVL IV, 30-39 MIN: ICD-10-PCS | Mod: S$PBB,,, | Performed by: FAMILY MEDICINE

## 2023-09-19 PROCEDURE — 99214 OFFICE O/P EST MOD 30 MIN: CPT | Mod: PBBFAC,PN | Performed by: FAMILY MEDICINE

## 2023-09-19 PROCEDURE — 99999 PR PBB SHADOW E&M-EST. PATIENT-LVL IV: ICD-10-PCS | Mod: PBBFAC,,, | Performed by: FAMILY MEDICINE

## 2023-09-19 RX ORDER — MELOXICAM 15 MG/1
15 TABLET ORAL
COMMUNITY
Start: 2023-09-18

## 2023-09-19 NOTE — PROGRESS NOTES
Subjective:       Patient ID: Farideh Andre is a 63 y.o. female.    Chief Complaint: Shoulder Pain    Pt presents today for HTN f/u. No complaints exc for ongoing joint aches and pains. Wants to know why her hands, wrists, knees, shoulders are all aching here. I explained that I still suspect OA, cer spine issues.      Pt taking NSAIDS and tylenol which do help but not enough. Pt is seeing hand specialist and had injection last week      Denies any n/v/f/chills      Shoulder Pain   Pertinent negatives include no fever, headaches or numbness.   Knee Pain   Pertinent negatives include no numbness.   Wrist Pain   Pertinent negatives include no fever or numbness.   Elbow Pain  Associated symptoms include arthralgias and joint swelling. Pertinent negatives include no abdominal pain, chest pain, chills, congestion, coughing, fatigue, fever, headaches, myalgias, nausea, neck pain, numbness, sore throat, vomiting or weakness.   URI   Pertinent negatives include no abdominal pain, chest pain, congestion, coughing, diarrhea, dysuria, ear pain, headaches, nausea, neck pain, rhinorrhea, sore throat, vomiting or wheezing.     Review of Systems   Constitutional:  Negative for activity change, appetite change, chills, fatigue, fever and unexpected weight change.   HENT:  Negative for congestion, ear pain, hearing loss, rhinorrhea, sinus pressure, sore throat and trouble swallowing.    Eyes:  Negative for photophobia, pain, discharge and visual disturbance.   Respiratory:  Negative for apnea, cough, chest tightness, shortness of breath and wheezing.    Cardiovascular:  Negative for chest pain, palpitations and leg swelling.   Gastrointestinal:  Negative for abdominal distention, abdominal pain, blood in stool, constipation, diarrhea, nausea and vomiting.   Endocrine: Negative for polydipsia and polyuria.   Genitourinary: Negative.  Negative for difficulty urinating, dysuria, hematuria and menstrual problem.    Musculoskeletal:  Positive for arthralgias and joint swelling. Negative for back pain, gait problem, myalgias, neck pain and neck stiffness.   Skin: Negative.    Neurological:  Negative for dizziness, tremors, weakness, numbness and headaches.   Psychiatric/Behavioral:  Negative for behavioral problems, confusion, decreased concentration, dysphoric mood and sleep disturbance. The patient is not nervous/anxious.    All other systems reviewed and are negative.      Objective:      Physical Exam  Vitals reviewed.   Constitutional:       General: She is not in acute distress.     Appearance: Normal appearance. She is well-developed and normal weight. She is not ill-appearing, toxic-appearing or diaphoretic.   HENT:      Head: Normocephalic and atraumatic.      Right Ear: Ear canal and external ear normal. A middle ear effusion is present. There is no impacted cerumen.      Left Ear: Ear canal and external ear normal. A middle ear effusion is present. There is no impacted cerumen.      Nose: Nose normal.      Mouth/Throat:      Pharynx: Posterior oropharyngeal erythema present. No oropharyngeal exudate.   Eyes:      Extraocular Movements: Extraocular movements intact.      Conjunctiva/sclera: Conjunctivae normal.      Pupils: Pupils are equal, round, and reactive to light.   Neck:      Thyroid: No thyromegaly.   Cardiovascular:      Rate and Rhythm: Normal rate and regular rhythm.      Pulses: Normal pulses.      Heart sounds: Normal heart sounds. No murmur heard.  Pulmonary:      Effort: Pulmonary effort is normal. No respiratory distress.      Breath sounds: Normal breath sounds. No stridor. No wheezing, rhonchi or rales.   Chest:      Chest wall: No tenderness.   Abdominal:      General: Bowel sounds are normal. There is no distension.      Palpations: Abdomen is soft. There is no mass.      Tenderness: There is no abdominal tenderness. There is no guarding or rebound.   Musculoskeletal:         General: Swelling  and tenderness present. Injury: pos crepitus b/l knees. LROM upper extremities.Normal range of motion.      Cervical back: Normal range of motion and neck supple.      Right lower leg: No edema.      Left lower leg: No edema.   Lymphadenopathy:      Cervical: No cervical adenopathy.   Skin:     General: Skin is warm and dry.      Findings: No erythema.   Neurological:      General: No focal deficit present.      Mental Status: She is alert and oriented to person, place, and time. Mental status is at baseline.      Cranial Nerves: No cranial nerve deficit.      Sensory: No sensory deficit.      Motor: No weakness or abnormal muscle tone.      Coordination: Coordination normal.      Gait: Gait normal.      Deep Tendon Reflexes: Reflexes are normal and symmetric. Reflexes normal.   Psychiatric:         Mood and Affect: Mood normal.         Behavior: Behavior normal.         Thought Content: Thought content normal.         Judgment: Judgment normal.         Assessment:       1. Other osteoarthritis of spine, cervical region    2. Primary hypertension    3. Left arm pain      annual exam  Seasonal allergies  Plan:       PE wnl  Will see Dr. Ashraf for GYN  Allergies controlled per pt with OTC's  Other osteoarthritis of spine, cervical region  -     Ambulatory referral/consult to Back & Spine Clinic; Future; Expected date: 09/26/2023  -     MRI Cervical Spine Without Contrast; Future; Expected date: 09/19/2023    Primary hypertension    Left arm pain      Suspect that this could be due to OA, carpal tunnel and cerv stenosis /degen disease. Will refer to back and spine and try for MRI prior. Pt is amenable with plan, pt is teary eyed throughout encounter.     Cont present management for chol, HTN controlled    RTC q 6 mos or prn

## 2023-09-26 ENCOUNTER — PATIENT MESSAGE (OUTPATIENT)
Dept: PRIMARY CARE CLINIC | Facility: CLINIC | Age: 63
End: 2023-09-26
Payer: MEDICARE

## 2023-09-26 ENCOUNTER — HOSPITAL ENCOUNTER (OUTPATIENT)
Dept: RADIOLOGY | Facility: OTHER | Age: 63
Discharge: HOME | End: 2023-09-26
Attending: FAMILY MEDICINE
Payer: MEDICARE

## 2023-09-26 DIAGNOSIS — M47.892 OTHER OSTEOARTHRITIS OF SPINE, CERVICAL REGION: ICD-10-CM

## 2023-09-26 PROCEDURE — 72141 MRI NECK SPINE W/O DYE: CPT | Mod: TC

## 2023-09-26 PROCEDURE — 72141 MRI NECK SPINE W/O DYE: CPT | Mod: 26,,, | Performed by: INTERNAL MEDICINE

## 2023-09-26 PROCEDURE — 72141 MRI CERVICAL SPINE WITHOUT CONTRAST: ICD-10-PCS | Mod: 26,,, | Performed by: INTERNAL MEDICINE

## 2023-09-29 ENCOUNTER — TELEPHONE (OUTPATIENT)
Dept: SPINE | Facility: CLINIC | Age: 63
End: 2023-09-29
Payer: MEDICARE

## 2023-10-03 ENCOUNTER — TELEPHONE (OUTPATIENT)
Dept: PRIMARY CARE CLINIC | Facility: CLINIC | Age: 63
End: 2023-10-03
Payer: MEDICARE

## 2023-10-03 ENCOUNTER — TELEPHONE (OUTPATIENT)
Dept: SPINE | Facility: CLINIC | Age: 63
End: 2023-10-03
Payer: MEDICARE

## 2023-10-03 DIAGNOSIS — M48.02 CERVICAL STENOSIS OF SPINAL CANAL: Primary | ICD-10-CM

## 2023-10-03 NOTE — TELEPHONE ENCOUNTER
----- Message from Dara Liao DO sent at 9/26/2023  1:24 PM CDT -----  Regarding: Results sent to patient    ----- Message -----  From: Dara Liao DO  Sent: 9/26/2023   1:24 PM CDT  To: MD DEMARIO Bethea while you were out

## 2023-10-03 NOTE — TELEPHONE ENCOUNTER
Staff contacted patient to confirm appointment on 10/4/23 for 1:00 pm with Marya Ryan NP. Staff informed patient that her appointment will be on 9th floor [id number].

## 2023-10-04 ENCOUNTER — OFFICE VISIT (OUTPATIENT)
Dept: SPINE | Facility: CLINIC | Age: 63
End: 2023-10-04
Attending: FAMILY MEDICINE
Payer: MEDICARE

## 2023-10-04 VITALS
BODY MASS INDEX: 35.99 KG/M2 | OXYGEN SATURATION: 100 % | DIASTOLIC BLOOD PRESSURE: 59 MMHG | HEART RATE: 72 BPM | SYSTOLIC BLOOD PRESSURE: 123 MMHG | WEIGHT: 216 LBS | HEIGHT: 65 IN | RESPIRATION RATE: 12 BRPM

## 2023-10-04 DIAGNOSIS — M47.892 OTHER OSTEOARTHRITIS OF SPINE, CERVICAL REGION: ICD-10-CM

## 2023-10-04 DIAGNOSIS — M47.22 OSTEOARTHRITIS OF SPINE WITH RADICULOPATHY, CERVICAL REGION: ICD-10-CM

## 2023-10-04 DIAGNOSIS — M79.18 MYOFASCIAL PAIN: ICD-10-CM

## 2023-10-04 DIAGNOSIS — M50.30 DDD (DEGENERATIVE DISC DISEASE), CERVICAL: Primary | ICD-10-CM

## 2023-10-04 PROCEDURE — 99999 PR PBB SHADOW E&M-EST. PATIENT-LVL V: ICD-10-PCS | Mod: PBBFAC,,, | Performed by: NURSE PRACTITIONER

## 2023-10-04 PROCEDURE — 99214 OFFICE O/P EST MOD 30 MIN: CPT | Mod: S$PBB,,, | Performed by: NURSE PRACTITIONER

## 2023-10-04 PROCEDURE — 99999 PR PBB SHADOW E&M-EST. PATIENT-LVL V: CPT | Mod: PBBFAC,,, | Performed by: NURSE PRACTITIONER

## 2023-10-04 PROCEDURE — 99214 PR OFFICE/OUTPT VISIT, EST, LEVL IV, 30-39 MIN: ICD-10-PCS | Mod: S$PBB,,, | Performed by: NURSE PRACTITIONER

## 2023-10-04 PROCEDURE — 99215 OFFICE O/P EST HI 40 MIN: CPT | Mod: PBBFAC | Performed by: NURSE PRACTITIONER
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
## 2023-10-31 ENCOUNTER — CLINICAL SUPPORT (OUTPATIENT)
Dept: REHABILITATION | Facility: OTHER | Age: 63
End: 2023-10-31
Payer: MEDICARE

## 2023-10-31 DIAGNOSIS — M79.18 MYOFASCIAL PAIN: ICD-10-CM

## 2023-10-31 DIAGNOSIS — M50.30 DDD (DEGENERATIVE DISC DISEASE), CERVICAL: ICD-10-CM

## 2023-10-31 DIAGNOSIS — M47.22 OSTEOARTHRITIS OF SPINE WITH RADICULOPATHY, CERVICAL REGION: ICD-10-CM

## 2023-10-31 PROCEDURE — 97161 PT EVAL LOW COMPLEX 20 MIN: CPT | Mod: PN

## 2023-11-01 NOTE — PLAN OF CARE
[facility]   Physical Therapy Initial Evaluation      Name: Farideh Andre  Clinic Number: [id number]    Therapy Diagnosis:   Encounter Diagnoses   Name Primary?    DDD (degenerative disc disease), cervical     Osteoarthritis of spine with radiculopathy, cervical region     Myofascial pain         Physician: Marya Ryan, NP    Physician Orders: PT Eval and Treat   Medical Diagnosis from Referral:   M50.30 (ICD-10-CM) - DDD (degenerative disc disease), cervical   M47.22 (ICD-10-CM) - Osteoarthritis of spine with radiculopathy, cervical region   M79.18 (ICD-10-CM) - Myofascial pain     Evaluation Date: 10/31/2023  Authorization Period Expiration: 10/3/2024  Plan of Care Expiration: 11/7/2023  Visit # / Visits authorized: 1/ 1   FOTO: 0/ 3    Precautions: HTN     Time In: 8:10  Time Out: 8:50  Total Billable Time: 40 minutes    Subjective     Date of onset: Over a year    History of current condition - Farideh reports: She has been having right hand pain for over a year. She states she cannot fully squeeze her right hand or move her wrist around. She has started having in pain in her arm and her shoulder, but that has only been recently. She was told that she has a disc problem in her neck and that may be causing her hand pain. She has not had any shoulder pain since she had a steroid injection about 2 months ago into her wrist/hand. The pain that goes down her arm is sharp. She has been having difficulty grooming and picking up her grand kids. She sometimes has numbness and tingling in her hand.       Falls: No    Imaging: MRI studies: Impression:     Degenerative changes resulting in moderate canal stenosis at C4-5 and C5-6 and moderate left foraminal stenosis at C6-7.    Prior Therapy: Yes  Social History: lives with their spouse  Occupation: Retired  Prior Level of Function: Pt was able to groom herself and use her hands without any pain or difficulty  Current Level of  Function: Pt now has difficulty picking up items, writing, grooming due to wrist pain and lack of mobility     Pain:   Current 0/10, worst 5/10, best 0/10   Location: right, bilateral hands and wrist   Description: Aching  Aggravating Factors: gripping, picking up items  Easing Factors: injection    Patients goals: Pt would like to increase her use of her hands for grooming and picking up kids     Medical History:   Past Medical History:   Diagnosis Date    Angioedema 2016    Coronary artery disease     Hypertension     Obesity     Urticaria        Surgical History:   Farideh Andre  has a past surgical history that includes s/p LAD coated stent (2010); s/p LAD coated (2009);  section; Tonsillectomy; and Colonoscopy (N/A, 2015).    Medications:   Farideh has a current medication list which includes the following prescription(s): albuterol, anastrozole, aspirin, cholecalciferol (vitamin d3), epinephrine, ibuprofen, ibuprofen, insulin syringes (disposable), loratadine, meloxicam, metoprolol succinate, and rosuvastatin.    Allergies:   Review of patient's allergies indicates:   Allergen Reactions    Sulfa (sulfonamide antibiotics)      Hives    Other reaction(s): Hives        Objective          Observation: Pt has noticeable edema in her right hand compared to the left    Cervical Range of Motion:    Degrees(%) Pain   Flexion 90% stretch     Extension 80% -     Right Rotation 50 degrees -     Left Rotation 50 degrees -     Right Side Bending 80% -   Left Side Bending 80% -      Wrist Range of Motion:   Wrist Left Right   Flexion 50 50   Extension 60 30     Upper Extremity Strength  (R) UE  (L) UE    Scapular Elevation: 5/5 Scapular Elevation: 5/5   Shoulder Abduction: 4/5 Shoulder abduction: 4+/5   Elbow flexion: 5/5 Elbow flexion: 5/5   Elbow extension: 5/5 Elbow extension: 5/5   Wrist flexion: 3+/5 pain Wrist flexion: 4+/5   Wrist extension: 3+/5 pain Wrist extension: 4+/5     10#  : 30#   Thumb Extension 4/5 Thumb Extension 5/5   Finger Adduction/ Abduction 4+/5 Finger Adduction/ Abduction 4+/5       Special Tests:  Distraction -   Spurlings -   Quadrant Testing -     Palpation: Tenderness around wrist      Sensation: Intact dermatomes    Neural tension:   -ULTT Median: + at end range, pain in wrist  -ULTT Ulnar: -  -ULTT Radial: -    Limitation/Restriction for FOTO Lumbar Survey    Therapist reviewed FOTO scores for Farideh Andre on 10/31/2023.   FOTO documents entered into [employer] - see Media section.    Limitation Score: 52%            Treatment     Total Treatment time (time-based codes) separate from Evaluation: 4 minutes     Farideh received the treatments listed below:      neuromuscular re-education activities to improve: Coordination, Kinesthetic, and Proprioception for 4 minutes. The following activities were included:  Median nerve floss 25x      Patient Education and Home Exercises     Education provided:   - HEP  - Getting referral to hand therapist    Written Home Exercises Provided: yes. Exercises were reviewed and Farideh was able to demonstrate them prior to the end of the session.  Farideh demonstrated good  understanding of the education provided. See EMR under Patient Instructions for exercises provided during therapy sessions.    Assessment     Farideh is a 63 y.o. female referred to outpatient Physical Therapy with a medical diagnosis of   M50.30 (ICD-10-CM) - DDD (degenerative disc disease), cervical   M47.22 (ICD-10-CM) - Osteoarthritis of spine with radiculopathy, cervical region   M79.18 (ICD-10-CM) - Myofascial pain   . Patient presents with decreased wrist mobility, decreased  strength, edema in wrist/ hand, adverse neural tension and decreased in upper extremity strength. Her primary complaint was for her hand and cervical involvement was ruled out with cervical radiculopathy cluster. Plan to message referring provider to place an order for OT for  the patients hand and wrist. Her impairments are limiting her ability to perform her ADL's and perform her daily hygiene.       Patient prognosis is Good.   Patient will benefit from skilled outpatient Physical Therapy to address the deficits stated above and in the chart below, provide patient /family education, and to maximize patientt's level of independence.     Plan of care discussed with patient: Yes  Patient's spiritual, cultural and educational needs considered and patient is agreeable to the plan of care and goals as stated below:     Anticipated Barriers for therapy: None    Medical Necessity is demonstrated by the following  History  Co-morbidities and personal factors that may impact the plan of care [x] LOW: no personal factors / co-morbidities  [] MODERATE: 1-2 personal factors / co-morbidities  [] HIGH: 3+ personal factors / co-morbidities    Moderate / High Support Documentation:   Co-morbidities affecting plan of care:     Personal Factors:   no deficits     Examination  Body Structures and Functions, activity limitations and participation restrictions that may impact the plan of care [x] LOW: addressing 1-2 elements  [] MODERATE: 3+ elements  [] HIGH: 4+ elements (please support below)    Moderate / High Support Documentation:      Clinical Presentation [x] LOW: stable  [] MODERATE: Evolving  [] HIGH: Unstable     Decision Making/ Complexity Score: low       Goals:  Short Term Goals: 1 weeks  1. Pt to tolerate HEP to improve ROM and independence with ADL's    Plan     Plan of care Certification: 10/31/2023 to 11/7/2023.    Outpatient Physical Therapy 1-2 times weekly for 1 weeks to include the following interventions: Manual Therapy, Neuromuscular Re-ed, Patient Education, Therapeutic Activities, and Therapeutic Exercise.     Garrick Clayton, PT, DPT        Physician's Signature: _________________________________________ Date: ________________

## 2023-11-02 ENCOUNTER — TELEPHONE (OUTPATIENT)
Dept: SPINE | Facility: CLINIC | Age: 63
End: 2023-11-02
Payer: MEDICARE

## 2023-11-02 DIAGNOSIS — M79.643 PAIN OF HAND, UNSPECIFIED LATERALITY: Primary | ICD-10-CM

## 2023-11-02 NOTE — TELEPHONE ENCOUNTER
----- Message from Garrick Clayton PT sent at 11/1/2023  9:42 AM CDT -----  Regarding: New Referral  Good Marisela Malhotra,    I evaluated Mrs. Andre yesterday morning for her neck and hand pain. I was not able to recreate her hand pain with any assessment of her cervical spine and I believe that many of her hand symptoms are unrelated to her neck. I think she would benefit from OT for her hand due to edema, lack of wrist range of motion and lack of  strength in her hand. Would you be able to place a new referral for hand therapy?     Thank You,  Garrick Clayton, PT, DPT

## 2023-11-03 NOTE — PATIENT INSTRUCTIONS
Tendon Glides and Joint Blocking        Start at position A and move through each position slowly attempting to achieve full glide.  A-E is ONE repetition.     Complete 10 reps at 3 times per day.                        Or       Carpal tunnel braces , William brace is a good brand.   Wear brace 24/7, take off for hygiene and HEP         [employer] Bed Carlos Heat Pad and Cooling Neck Wrap

## 2023-11-03 NOTE — PROGRESS NOTES
"      [facility]  Occupational Therapy Initial Evaluation           Date: 2023  Name: Farideh Andre  Clinic Number: [id number]    Therapy Diagnosis:   Encounter Diagnosis   Name Primary?    Pain of hand, unspecified laterality      Physician: Marya Ryan NP    Physician Orders: Evaluate and treat ; 2x/wk x 6 wks , Modalities prn  Medical Diagnosis: M79.643 (ICD-10-CM) - Pain of hand, unspecified laterality  Evaluation Date: 2023  Date of Surgery: none  Insurance Authorization  Period Expiration : 11/3/23- 23    Plan of Care Expiration: 2023  Date of Return to MD: ANYI     Visit # / Visits authorized:   Time In:7:30 am  Time Out: 8:15 am  Total Billable Time: 45 minutes    Precautions:  Standard    FOTO: 46      Subjective       Involved Side: Right hand  Dominant Side: Right    Date of Onset: "over a year"     History of Current Condition/Mechanism of Injury: insidious     Imaging: Please see image report     Surgical Procedure and Date: none    Past Medical History/Physical Systems Review:   Farideh Andre  has a past medical history of Angioedema, Coronary artery disease, Hypertension, Obesity, and Urticaria.    Farideh Andre  has a past surgical history that includes s/p LAD coated stent (2010); s/p LAD coated (2009);  section; Tonsillectomy; and Colonoscopy (N/A, 2015).    Farideh has a current medication list which includes the following prescription(s): albuterol, anastrozole, aspirin, cholecalciferol (vitamin d3), epinephrine, ibuprofen, ibuprofen, insulin syringes (disposable), loratadine, meloxicam, metoprolol succinate, and rosuvastatin.    Review of patient's allergies indicates:   Allergen Reactions    Sulfa (sulfonamide antibiotics)      Hives    Other reaction(s): Hives            Pain:  Functional Pain Scale Rating 0-10:   5/10 on current  Location: Right hand    Patient's Goals for Therapy:  " to increase motion, reduce pain, return to normal function of hand     Occupation:  retired       Functional Limitations/Social History:    Previous functional status includes: Independent with all ADLs.     Current FunctionalStatus   Home/Living environment : lives with their family      Limitation of Functional Status as follows:   ADLs/IADLs: Prior functional status of self care needs: was independent in feeding, dressing, grooming, leisure task -difficulty brushing teeth, using the bathroom, opening jars                 Current status of self care needs: Independent while protecting hand and wrist   Objective     Observation/Appearance:  Joint stiffness, raised dorsal aspect of right wrist     Sensation: Ulnar Nerve, median nerve Distribution   11/6/2023    Right   Monofilament Testing    Normal 1.65-2.83 Present digits 2-5   Diminished Light Touch 3.22-3.61 Present  Thumb   Diminished Protective 3.84-4.31 Present   Loss of Protective 4.56-6.65 Present   Untestable >6.65 Present   Comments: Pt with delay noting sensation in all digits.     Range of Motion:   Right Active  Index  11/6/2023                         MP Ext/Flex 0/50                         PIP Ext/Flex 0/85                         DIP Ext/Flex 0/50     Long 11/6/2023                         MP Ext/Flex 0/55                         PIP Ext/Flex 0/90                         DIP Ext/Flex 0/60     Ring 11/6/2023                         MP Ext/Flex 0/30                         PIP Ext/Flex 0/90                         DIP Ext/Flex 0/60      11/6/2023   small                          MP Ext/Flex 0/15                         PIP Ext/Flex 0/85                         DIP Ext/Flex 0/45   THUMB                          MP Ext/Flex 0/52                         IP Ext/Flex 0/44                         Smalls ABD nt                         Radial ABD nt                         Opposition nt     Comments: Difficulty making a composite fist     finger  Wrist ROM:  Right  Active   11/6/2023   Extension 30   Flexion 45   Radial Deviation 15   Ulnar Deviation 30   Supination Full    Pronation Full      Special Tests:   Right   11/6/2023   Tinel's Test at carpal tunnel, guyons canal -   Phalens  -   Comments: Pt noted pain in dorsal right wrist during Phalens test.         Strength (Dyanmometer) and Pinch Strength (Pinch Gauge)  Measured in pounds and psi. Average of three trials.   11/6/2023 11/6/2023    Right  Left   Rung II 7.4 24.2   Key Pinch 8 12   3pt Pinch 5 9   2pt Pinch 5        9         Intake Outcome Measure for FOTO Survey    Therapist reviewed FOTO scores for Farideh Andre on 11/6/2023.   FOTO documents entered into [email or web address] - see Media section.    Intake Score: 46         Treatment     Total Treatment time separate from Evaluation time: about 15 minutes after evaluation performed the following and went over HEP    Farideh received the treatments listed below:     We talked at length about the anatomy and pathophysiology of diagnosis , prevention of injury/re-injury, and answered all questions patients had.    Discussed effect of carpal tunnel brace and wear and care schedule.     Home Exercise Program/Education:  Issued HEP (see patient instructions in EMR) and educated on modality use for pain management . Exercises were reviewed and Farideh was able to demonstrate them prior to the end of the session.     Pt received a written copy of exercises to perform at home. Farideh demonstrated good  understanding of the education provided.  Pt was advised to perform these exercises free of pain, and to stop performing them if pain occurs.    Patient/Family Education: role of OT, goals for OT, double booking , scheduling/cancellations - pt verbalized understanding. Discussed insurance limitations with patient.    Additional Education provided: role of CHT/OT, goals for CHT/OT, discussed insurance limitation with patient- patient verbalized understanding        Assessment     This 63 y.o. female referred to Outpatient Occupational Therapy with diagnosis of   Encounter Diagnosis   Name Primary?    Pain of hand, unspecified laterality     presents with limitations as described in problem list. Patient can benefit from Occupational Therapy services for Iontophoresis, Ultrasound, moist heat, PROM, AAROM, AROM, Theraputic exercises, joint mobs, home exercise program provied with written instructions, strengthening, and Theraband Ex and Orthosis, if deemed necessary . The following goals were discussed with the patient and she is in agreement with them as to be addressed in the treatment plan.     Pt reports she has several carpal tunnel braces at home as well as paraffin wax. Pt states she does not use them consistently. Provided education on importance of wearing throughout day/night to prevent further compression of median nerve. Pt states she has arthritis gloves that she wears in the morning when pain is highest.     The patient's rehab potential is Good.     Problem List:   Decreased ROM, Increased pain, and Decreased strength      Anticipated barriers to occupational therapy: none  Pt has no cultural, educational or language barriers to learning provided.      Medical Necessity is demonstrated by the following  Occupational Profile/History  Co-morbidities and personal factors that may impact the plan of care [x] LOW: Brief chart review  [] MODERATE: Expanded chart review   [] HIGH: Extensive chart review    Moderate / High Support Documentation     Examination  Performance deficits relating to physical, cognitive or psychosocial skills that result in activity limitations and/or participation restrictions  [x] LOW: addressing 1-3 Performance deficits  [] MODERATE: 3-5 Performance deficits  [] HIGH: 5+ Performance deficits (please support below)    Moderate / High Support Documentation:    Physical:  Joint Mobility  Muscle Power/Strength   Strength  Pinch  Strength  Pain    Cognitive:  No Deficits    Psychosocial:    No Deficits  NO deficits      Treatment Options [x] LOW: Limited options  [] MODERATE: Several options  [] HIGH: Multiple options      Decision Making/ Complexity Score: low             The following goals were discussed with the patient and patient is in agreement with them as to be addressed in the treatment plan.     Goals:   ST-8  weeks :  1) Patient to be IND with HEP and modalities for pain managment.  2) Patient will  Increase Active Range of motion 8-10 degrees in wrist to increase functional hand use for ADLs/work/leisure activities.  3) Pt will  increase FOTO  intake score to 85 to increase their hand/wrist functional ability.  4)Pt will increase  strength 10-20 lbs. to improve functional grasp for ADLs/work/leisure activities.   5) Pt will increase pinch strength in all 3 limited positions by 1-3 psi to assist with manipulation and fine motor task      Plan       Certification Period/Plan of care expiration: 2023 to 2023.    Outpatient Occupational Therapy 1 times weekly for 6 weeks to include the following interventions: Paraffin, Fluidotherapy, Manual therapy/joint mobilizations, Modalities for pain management, US 3 mhz, Therapeutic exercises/activities., Iontophoresis with 2.0 cc Dexamethasone, Strengthening, and Electrical Modalities.      NATASHA Shah

## 2023-11-06 ENCOUNTER — CLINICAL SUPPORT (OUTPATIENT)
Dept: REHABILITATION | Facility: HOSPITAL | Age: 63
End: 2023-11-06
Payer: MEDICARE

## 2023-11-06 DIAGNOSIS — M79.641 BILATERAL HAND PAIN: ICD-10-CM

## 2023-11-06 DIAGNOSIS — M79.643 PAIN OF HAND, UNSPECIFIED LATERALITY: ICD-10-CM

## 2023-11-06 DIAGNOSIS — M79.642 BILATERAL HAND PAIN: ICD-10-CM

## 2023-11-06 PROCEDURE — 97165 OT EVAL LOW COMPLEX 30 MIN: CPT | Mod: PO

## 2023-11-06 PROCEDURE — 97530 THERAPEUTIC ACTIVITIES: CPT | Mod: PO

## 2023-11-06 NOTE — PLAN OF CARE
"      [facility]  Occupational Therapy Initial Evaluation           Date: 2023  Name: Farideh Andre  Clinic Number: [id number]    Therapy Diagnosis:   Encounter Diagnosis   Name Primary?    Pain of hand, unspecified laterality      Physician: Marya Ryan NP    Physician Orders: Evaluate and treat ; 2x/wk x 6 wks , Modalities prn  Medical Diagnosis: M79.643 (ICD-10-CM) - Pain of hand, unspecified laterality  Evaluation Date: 2023  Date of Surgery: none  Insurance Authorization  Period Expiration : 11/3/23- 23    Plan of Care Expiration: 2023  Date of Return to MD: ANYI     Visit # / Visits authorized:   Time In:7:30 am  Time Out: 8:15 am  Total Billable Time: 45 minutes    Precautions:  Standard    FOTO: 46      Subjective       Involved Side: Right hand  Dominant Side: Right    Date of Onset: "over a year"     History of Current Condition/Mechanism of Injury: insidious     Imaging: Please see image report     Surgical Procedure and Date: none    Past Medical History/Physical Systems Review:   Farideh Andre  has a past medical history of Angioedema, Coronary artery disease, Hypertension, Obesity, and Urticaria.    Faridhe Andre  has a past surgical history that includes s/p LAD coated stent (2010); s/p LAD coated (2009);  section; Tonsillectomy; and Colonoscopy (N/A, 2015).    Farideh has a current medication list which includes the following prescription(s): albuterol, anastrozole, aspirin, cholecalciferol (vitamin d3), epinephrine, ibuprofen, ibuprofen, insulin syringes (disposable), loratadine, meloxicam, metoprolol succinate, and rosuvastatin.    Review of patient's allergies indicates:   Allergen Reactions    Sulfa (sulfonamide antibiotics)      Hives    Other reaction(s): Hives            Pain:  Functional Pain Scale Rating 0-10:   5/10 on current  Location: Right hand    Patient's Goals for Therapy:  " to increase motion, reduce pain, return to normal function of hand     Occupation:  retired       Functional Limitations/Social History:    Previous functional status includes: Independent with all ADLs.     Current FunctionalStatus   Home/Living environment : lives with their family      Limitation of Functional Status as follows:   ADLs/IADLs: Prior functional status of self care needs: was independent in feeding, dressing, grooming, leisure task -difficulty brushing teeth, using the bathroom, opening jars                 Current status of self care needs: Independent while protecting hand and wrist   Objective     Observation/Appearance:  Joint stiffness, raised dorsal aspect of right wrist     Sensation: Ulnar Nerve, median nerve Distribution   11/6/2023    Right   Monofilament Testing    Normal 1.65-2.83 Present digits 2-5   Diminished Light Touch 3.22-3.61 Present  Thumb   Diminished Protective 3.84-4.31 Present   Loss of Protective 4.56-6.65 Present   Untestable >6.65 Present   Comments: Pt with delay noting sensation in all digits.     Range of Motion:   Right Active  Index  11/6/2023                         MP Ext/Flex 0/50                         PIP Ext/Flex 0/85                         DIP Ext/Flex 0/50     Long 11/6/2023                         MP Ext/Flex 0/55                         PIP Ext/Flex 0/90                         DIP Ext/Flex 0/60     Ring 11/6/2023                         MP Ext/Flex 0/30                         PIP Ext/Flex 0/90                         DIP Ext/Flex 0/60      11/6/2023   small                          MP Ext/Flex 0/15                         PIP Ext/Flex 0/85                         DIP Ext/Flex 0/45   THUMB                          MP Ext/Flex 0/52                         IP Ext/Flex 0/44                         Smalls ABD nt                         Radial ABD nt                         Opposition nt     Comments: Difficulty making a composite fist     finger  Wrist ROM:  Right  Active   11/6/2023   Extension 30   Flexion 45   Radial Deviation 15   Ulnar Deviation 30   Supination Full    Pronation Full      Special Tests:   Right   11/6/2023   Tinel's Test at carpal tunnel, guyons canal -   Phalens  -   Comments: Pt noted pain in dorsal right wrist during Phalens test.         Strength (Dyanmometer) and Pinch Strength (Pinch Gauge)  Measured in pounds and psi. Average of three trials.   11/6/2023 11/6/2023    Right  Left   Rung II 7.4 24.2   Key Pinch 8 12   3pt Pinch 5 9   2pt Pinch 5        9         Intake Outcome Measure for FOTO Survey    Therapist reviewed FOTO scores for Farideh Andre on 11/6/2023.   FOTO documents entered into [employer] - see Media section.    Intake Score: 46         Treatment     Total Treatment time separate from Evaluation time: about 15 minutes after evaluation performed the following and went over HEP    Farideh received the treatments listed below:     We talked at length about the anatomy and pathophysiology of diagnosis , prevention of injury/re-injury, and answered all questions patients had.    Discussed effect of carpal tunnel brace and wear and care schedule.     Home Exercise Program/Education:  Issued HEP (see patient instructions in EMR) and educated on modality use for pain management . Exercises were reviewed and Farideh was able to demonstrate them prior to the end of the session.     Pt received a written copy of exercises to perform at home. Farideh demonstrated good  understanding of the education provided.  Pt was advised to perform these exercises free of pain, and to stop performing them if pain occurs.    Patient/Family Education: role of OT, goals for OT, double booking , scheduling/cancellations - pt verbalized understanding. Discussed insurance limitations with patient.    Additional Education provided: role of CHT/OT, goals for CHT/OT, discussed insurance limitation with patient- patient verbalized understanding        Assessment     This 63 y.o. female referred to Outpatient Occupational Therapy with diagnosis of   Encounter Diagnosis   Name Primary?    Pain of hand, unspecified laterality     presents with limitations as described in problem list. Patient can benefit from Occupational Therapy services for Iontophoresis, Ultrasound, moist heat, PROM, AAROM, AROM, Theraputic exercises, joint mobs, home exercise program provied with written instructions, strengthening, and Theraband Ex and Orthosis, if deemed necessary . The following goals were discussed with the patient and she is in agreement with them as to be addressed in the treatment plan.     Pt reports she has several carpal tunnel braces at home as well as paraffin wax. Pt states she does not use them consistently. Provided education on importance of wearing throughout day/night to prevent further compression of median nerve. Pt states she has arthritis gloves that she wears in the morning when pain is highest.     The patient's rehab potential is Good.     Problem List:   Decreased ROM, Increased pain, and Decreased strength      Anticipated barriers to occupational therapy: none  Pt has no cultural, educational or language barriers to learning provided.      Medical Necessity is demonstrated by the following  Occupational Profile/History  Co-morbidities and personal factors that may impact the plan of care [x] LOW: Brief chart review  [] MODERATE: Expanded chart review   [] HIGH: Extensive chart review    Moderate / High Support Documentation     Examination  Performance deficits relating to physical, cognitive or psychosocial skills that result in activity limitations and/or participation restrictions  [x] LOW: addressing 1-3 Performance deficits  [] MODERATE: 3-5 Performance deficits  [] HIGH: 5+ Performance deficits (please support below)    Moderate / High Support Documentation:    Physical:  Joint Mobility  Muscle Power/Strength   Strength  Pinch  Strength  Pain    Cognitive:  No Deficits    Psychosocial:    No Deficits  NO deficits      Treatment Options [x] LOW: Limited options  [] MODERATE: Several options  [] HIGH: Multiple options      Decision Making/ Complexity Score: low             The following goals were discussed with the patient and patient is in agreement with them as to be addressed in the treatment plan.     Goals:   ST-8  weeks :  1) Patient to be IND with HEP and modalities for pain managment.  2) Patient will  Increase Active Range of motion 8-10 degrees in wrist to increase functional hand use for ADLs/work/leisure activities.  3) Pt will  increase FOTO  intake score to 85 to increase their hand/wrist functional ability.  4)Pt will increase  strength 10-20 lbs. to improve functional grasp for ADLs/work/leisure activities.   5) Pt will increase pinch strength in all 3 limited positions by 1-3 psi to assist with manipulation and fine motor task      Plan       Certification Period/Plan of care expiration: 2023 to 2023.    Outpatient Occupational Therapy 1 times weekly for 6 weeks to include the following interventions: Paraffin, Fluidotherapy, Manual therapy/joint mobilizations, Modalities for pain management, US 3 mhz, Therapeutic exercises/activities., Iontophoresis with 2.0 cc Dexamethasone, Strengthening, and Electrical Modalities.      NATASHA Shah

## 2023-11-13 NOTE — PROGRESS NOTES
Occupational Therapy Daily Treatment Note     Date: 11/14/2023  Name: Farideh Andre  Clinic Number: [id number]    Therapy Diagnosis:   Encounter Diagnosis   Name Primary?    Bilateral hand pain Yes     Physician: Marya Ryan NP    Physician Orders: Evaluate and treat ; 2x/wk x 6 wks , Modalities prn  Medical Diagnosis: M79.643 (ICD-10-CM) - Pain of hand, unspecified laterality  Evaluation Date: 11/6/2023  Date of Surgery: none  Insurance Authorization  Period Expiration : 11/3/23- 12/29/23     Plan of Care Expiration: 12/22/2023  Date of Return to MD: TBD      Visit # / Visits authorized: 1 / 20  Time In:7:30 am  Time Out: 8:15 am  Total Billable Time: 45 minutes     Precautions:  Standard     FOTO: 46      Subjective     Pt reports: Performing HEP reugularly, only heating QD  Response to previous treatment:Kortney well    Pain: 4/10, 3/10 left  Location: Right hand, left hand    Objective     Farideh received the following supervised modalities after being cleared for contraindications for 10 minutes in order to promote increased blood flow and tissue elasticity:   -Fluidotherapy    Farideh participated in dynamic functional therapeutic activities to improve functional performance for 30  minutes, including:  -Reviewed pathophysiology of CTS, hand OA, trigger finger  -Comp fists BUE's 20  -Isospheres 3' BUE's  -Octi 3' BUE's  -Fabricated trigger finger splint for right LF to be worn during the day    Home Exercises and Education Provided     Education provided:   - Joint protection strategies, increasing effectiveness of paraffin  - Progress towards goals     Written Home Exercises Provided: Patient instructed to cont prior HEP.  Exercises were reviewed and Farideh was able to demonstrate them prior to the end of the session.  Farideh demonstrated good  understanding of the HEP provided.   .   See EMR under Patient Instructions for exercises provided prior visit.        Assessment     Pt demonstrated good  understanding of joint protection strategies.    Farideh is progressing towards her goals and there are no updates to goals at this time. Pt prognosis is Good.     Pt will continue to benefit from skilled outpatient occupational therapy to address the deficits listed in the problem list on initial evaluation provide pt/family education and to maximize pt's level of independence in the home and community environment.     Anticipated barriers to therapy: Medication-induced joint inflammation      Pt's spiritual, cultural and educational needs considered and pt agreeable to plan of care and goals.    Goals:  ST-8  weeks :  1) Patient to be IND with HEP and modalities for pain managment. Progressing  2) Patient will Increase Active Range of motion 8-10 degrees in wrist to increase functional hand use for ADLs/work/leisure activities. Progressing  3) Pt will  increase FOTO  intake score to 85 to increase their hand/wrist functional ability. Progressing  4)Pt will increase  strength 10-20 lbs. to improve functional grasp for ADLs/work/leisure activities. Progressing  5) Pt will increase pinch strength in all 3 limited positions by 1-3 psi to assist with manipulation and fine motor task. Progressing    Plan   Cont OT to address above goals.        ANIL Eng OTR/L, CHT

## 2023-11-14 ENCOUNTER — CLINICAL SUPPORT (OUTPATIENT)
Dept: REHABILITATION | Facility: HOSPITAL | Age: 63
End: 2023-11-14
Payer: MEDICARE

## 2023-11-14 DIAGNOSIS — M79.641 BILATERAL HAND PAIN: Primary | ICD-10-CM

## 2023-11-14 DIAGNOSIS — M79.642 BILATERAL HAND PAIN: Primary | ICD-10-CM

## 2023-11-14 PROCEDURE — 97022 WHIRLPOOL THERAPY: CPT | Mod: PO

## 2023-11-14 PROCEDURE — 97530 THERAPEUTIC ACTIVITIES: CPT | Mod: PO

## 2023-11-19 DIAGNOSIS — I10 HYPERTENSION, UNSPECIFIED TYPE: ICD-10-CM

## 2023-11-19 RX ORDER — METOPROLOL SUCCINATE 25 MG/1
TABLET, EXTENDED RELEASE ORAL
Qty: 90 TABLET | Refills: 3 | Status: SHIPPED | OUTPATIENT
Start: 2023-11-19

## 2023-11-19 NOTE — TELEPHONE ENCOUNTER
No care due was identified.  [facility] Embedded Care Due Messages. Reference number: [id number].   11/19/2023 4:01:00 AM CST

## 2023-11-19 NOTE — TELEPHONE ENCOUNTER
Refill Decision Note   Farideh Andre  is requesting a refill authorization.  Brief Assessment and Rationale for Refill:  Approve     Medication Therapy Plan:         Comments:     Note composed:11:15 AM 11/19/2023

## 2023-11-27 ENCOUNTER — CLINICAL SUPPORT (OUTPATIENT)
Dept: REHABILITATION | Facility: HOSPITAL | Age: 63
End: 2023-11-27
Payer: MEDICARE

## 2023-11-27 DIAGNOSIS — M79.642 BILATERAL HAND PAIN: Primary | ICD-10-CM

## 2023-11-27 DIAGNOSIS — M79.641 BILATERAL HAND PAIN: Primary | ICD-10-CM

## 2023-11-27 PROCEDURE — 97022 WHIRLPOOL THERAPY: CPT | Mod: PO

## 2023-11-27 PROCEDURE — 97530 THERAPEUTIC ACTIVITIES: CPT | Mod: PO

## 2023-11-27 NOTE — PATIENT INSTRUCTIONS
Perform 10 repetitions 3 times/day    MP Flexion (Active)        With back of hand on table, bend large knuckles as far as they will go, keeping small joints straight.  Repeat 20 times. Do 5 sessions per day.  Activity: Reach into a narrow container.*    PIP Flexion (Active Blocked)        Hold large knuckle straight using other hand. Bend middle joint of right middle finger as far as possible.   Repeat 20 times. Do 5 sessions per day.  Activity: Curl fingers around a jar cap.*     DIP Flexion (Active Blocked)        Hold right middle finger firmly at the middle so that only the tip joint can bend.   Repeat 20 times. Do 5 sessions per day.       MP Extension (Active)        With palm on table, straighten fingers completely at large knuckles, and lift fingers individually off table.   Repeat 20 times. Do 5 sessions per day.    Abduction / Adduction (Active)        With hand flat on table, spread all fingers apart, then bring them together as close as possible.  Repeat 20 times. Do 5 sessions per day.

## 2023-11-27 NOTE — PROGRESS NOTES
Occupational Therapy Daily Treatment Note     Date: 11/27/2023  Name: Farideh Andre  Clinic Number: [id number]    Therapy Diagnosis:   Encounter Diagnosis   Name Primary?    Bilateral hand pain Yes     Physician: Marya Ryan NP    Physician Orders: Evaluate and treat ; 2x/wk x 6 wks , Modalities prn  Medical Diagnosis: M79.643 (ICD-10-CM) - Pain of hand, unspecified laterality  Evaluation Date: 11/6/2023  Date of Surgery: none  Insurance Authorization  Period Expiration : 11/3/23- 12/29/23     Plan of Care Expiration: 12/22/2023  Date of Return to MD: TBD      Visit # / Visits authorized: 1 / 20  Time In:7:30 am  Time Out: 8:15 am  Total Billable Time: 45 minutes     Precautions:  Standard     FOTO: 46      Subjective     Pt reports: Performing HEP reugularly, only heating QD  Response to previous treatment:Kortney well    Pain: 5/10, 5/10 left  Location: Right hand, left hand    Objective     Farideh received the following supervised modalities after being cleared for contraindications for 10 minutes in order to promote increased blood flow and tissue elasticity:   -Patient received fluidotherapy to BUE hand(s) for 10 minutes to increase blood flow, circulation, desensitization, sensory re-education and for pain management.     Farideh participated in dynamic functional therapeutic activities to improve functional performance for 35  minutes, including:  - Comp fists BUE's 20  - Median nerve glides 10x each hand  - R LF PIP and DIP blocked flexion 20x each   - R MP flexion 20x  - Isospheres 2' bilateral upper extremities'  - Yellow flexbar pro/sup 20x   - Yellow flexbar WF/WE 20x each hand  - Med pom poms finger to palm translation  - Yellow putty gripping 1' each hand    Home Exercises and Education Provided     Education provided:   - Joint protection strategies, increasing effectiveness of paraffin  - Progress towards goals     Written Home Exercises Provided: Patient instructed to cont prior  HEP.  Exercises were reviewed and Farideh was able to demonstrate them prior to the end of the session.  Farideh demonstrated good  understanding of the HEP provided.   .   See EMR under Patient Instructions for exercises provided prior visit.        Assessment     Reviewd protection strategies with pt and pt was able to verbalize several discussed last session. Pt noted some pain with yellow putty gripping for 2 minutes but was able to tolerate it well for 1 minute. Provided education on effect of strengthening for OA and resting when irritated/inflamed. Pt verbalized understanding. Pt endorsed good splint wear with trigger finger splint fabricated last session.    Faridhe is progressing towards her goals and there are no updates to goals at this time. Pt prognosis is Good.     Pt will continue to benefit from skilled outpatient occupational therapy to address the deficits listed in the problem list on initial evaluation provide pt/family education and to maximize pt's level of independence in the home and community environment.     Anticipated barriers to therapy: Medication-induced joint inflammation      Pt's spiritual, cultural and educational needs considered and pt agreeable to plan of care and goals.    Goals:  ST-8  weeks :  1) Patient to be IND with HEP and modalities for pain managment. Progressing  2) Patient will Increase Active Range of motion 8-10 degrees in wrist to increase functional hand use for ADLs/work/leisure activities. Progressing  3) Pt will  increase FOTO  intake score to 85 to increase their hand/wrist functional ability. Progressing  4)Pt will increase  strength 10-20 lbs. to improve functional grasp for ADLs/work/leisure activities. Progressing  5) Pt will increase pinch strength in all 3 limited positions by 1-3 psi to assist with manipulation and fine motor task. Progressing    Plan   Cont OT to address above goals.      Yeni Valerio, OTR/L, MOT  Occupational  Therapist

## 2023-12-04 ENCOUNTER — CLINICAL SUPPORT (OUTPATIENT)
Dept: REHABILITATION | Facility: HOSPITAL | Age: 63
End: 2023-12-04
Payer: MEDICARE

## 2023-12-04 DIAGNOSIS — M79.641 BILATERAL HAND PAIN: Primary | ICD-10-CM

## 2023-12-04 DIAGNOSIS — M79.642 BILATERAL HAND PAIN: Primary | ICD-10-CM

## 2023-12-04 PROCEDURE — 97530 THERAPEUTIC ACTIVITIES: CPT | Mod: PO

## 2023-12-04 PROCEDURE — 97018 PARAFFIN BATH THERAPY: CPT | Mod: PO

## 2023-12-04 NOTE — PROGRESS NOTES
Occupational Therapy Daily Treatment Note     Date: 12/4/2023  Name: Farideh Andre  Clinic Number: [id number]    Therapy Diagnosis:   Encounter Diagnosis   Name Primary?    Bilateral hand pain Yes     Physician: Marya Ryan NP    Physician Orders: Evaluate and treat ; 2x/wk x 6 wks , Modalities prn  Medical Diagnosis: M79.643 (ICD-10-CM) - Pain of hand, unspecified laterality  Evaluation Date: 11/6/2023  Date of Surgery: none  Insurance Authorization  Period Expiration : 11/3/23- 12/29/23     Plan of Care Expiration: 12/22/2023  Date of Return to MD: TBD      Visit # / Visits authorized: 3 / 20  Time In:7:31 am  Time Out: 8:15 am  Total Billable Time: 44 minutes     Precautions:  Standard     FOTO: 46, 50  2/3    Subjective     Pt reports: They are still the same  Response to previous treatment:Kortney well    Pain: 4/10 R, 0/10 left  Location: Right hand, left hand    Objective     Range of Motion:   Right Active  Index  11/6/2023 12/4/23                         MP Ext/Flex 0/50 0/51                         PIP Ext/Flex 0/85 0/88                         DIP Ext/Flex 0/50 0/50      Long 11/6/2023 12/4/23                         MP Ext/Flex 0/55 0/59                         PIP Ext/Flex 0/90 0/95                         DIP Ext/Flex 0/60 0/62      Ring 11/6/2023 12/4/23                         MP Ext/Flex 0/30 0/38                         PIP Ext/Flex 0/90 0/95                         DIP Ext/Flex 0/60 0/49        11/6/2023 12/4/23   Small                            MP Ext/Flex 0/15 0/24                         PIP Ext/Flex 0/85 0/86                         DIP Ext/Flex 0/45 0/46   THUMB                            MP Ext/Flex 0/52 NT                         IP Ext/Flex 0/44 NT                         Smalls ABD nt NT                         Radial ABD nt NT                         Opposition nt NT      Comments: Difficulty making a composite fist      finger  Wrist ROM: Right  Active   Right Right Left      11/6/2023 12/4/23 12/4/23   Extension 30 49 64   Flexion 45 55 66   Radial Deviation 15 10 14   Ulnar Deviation 30 16 23   Supination Full  Full Full   Pronation Full  Full Full         Strength (Dyanmometer) and Pinch Strength (Pinch Gauge)  Measured in pounds and psi. Average of three trials.    11/6/2023 11/6/2023 12/4/23 12/4/23     Right  Left Right Left   Rung II 7.4 24.2 13.4 26.1   Garcia Pinch 8 12 9 11   3pt Pinch 5 9 8 7   2pt Pinch 5        9 NT NT       Farideh received the following supervised modalities after being cleared for contraindications for 10 minutes in order to promote increased blood flow and tissue elasticity:   -Patient received paraffin to BUE hand(s) for 10 minutes to increase blood flow, circulation, soften tissues and for pain management.     Farideh participated in dynamic functional therapeutic activities to improve functional performance for 34  minutes, including:  - Isospheres 2' bilateral upper extremities  - Coins finger to palm one container  - Nuts and bolts with fx pinch 6 items  - Hand Gripper with yellow rubber band 1' each hand  - Black rubber band abduction/adduction 1'    Home Exercises and Education Provided     Education provided:   - Joint protection strategies, increasing effectiveness of paraffin  - Progress towards goals     Written Home Exercises Provided: Patient instructed to cont prior HEP.  Exercises were reviewed and Farideh was able to demonstrate them prior to the end of the session.  Farideh demonstrated good  understanding of the HEP provided.   .   See EMR under Patient Instructions for exercises provided prior visit.        Assessment     Pt reports she wears her splints at night only, but sometimes does not 2/2 increased pain with splint wear. Pt with improved composite fist after heat observed this session. Pt with improvements in strength and mobility of right hand but has made no notable gains in composite fist ROM this session. Pt noted  discomfort at trigger finger on bilateral hands during hand gripper ax with some improvement after changing hand position to intrinsic minus position.    Farideh is progressing towards her goals and there are no updates to goals at this time. Pt prognosis is Good.     Pt will continue to benefit from skilled outpatient occupational therapy to address the deficits listed in the problem list on initial evaluation provide pt/family education and to maximize pt's level of independence in the home and community environment.     Anticipated barriers to therapy: Medication-induced joint inflammation      Pt's spiritual, cultural and educational needs considered and pt agreeable to plan of care and goals.    Goals:  ST-8  weeks :  1) Patient to be IND with HEP and modalities for pain managment. Progressing  2) Patient will Increase Active Range of motion 8-10 degrees in wrist to increase functional hand use for ADLs/work/leisure activities. Progressing  3) Pt will  increase FOTO  intake score to 85 to increase their hand/wrist functional ability. Progressing  4)Pt will increase  strength 10-20 lbs. to improve functional grasp for ADLs/work/leisure activities. Progressing  5) Pt will increase pinch strength in all 3 limited positions by 1-3 psi to assist with manipulation and fine motor task. Progressing    Plan   Cont OT to address above goals.      Yeni Valerio, OTR/L, MOT  Occupational Therapist

## 2023-12-11 ENCOUNTER — CLINICAL SUPPORT (OUTPATIENT)
Dept: REHABILITATION | Facility: HOSPITAL | Age: 63
End: 2023-12-11
Payer: MEDICARE

## 2023-12-11 DIAGNOSIS — M79.642 BILATERAL HAND PAIN: Primary | ICD-10-CM

## 2023-12-11 DIAGNOSIS — M79.641 BILATERAL HAND PAIN: Primary | ICD-10-CM

## 2023-12-11 PROCEDURE — 97018 PARAFFIN BATH THERAPY: CPT | Mod: PO

## 2023-12-11 PROCEDURE — 97530 THERAPEUTIC ACTIVITIES: CPT | Mod: PO

## 2023-12-11 NOTE — PROGRESS NOTES
Occupational Therapy Daily Treatment Note     Date: 12/11/2023  Name: Farideh Andre  Clinic Number: [id number]    Therapy Diagnosis:   Encounter Diagnosis   Name Primary?    Bilateral hand pain Yes     Physician: Marya Ryan NP    Physician Orders: Evaluate and treat ; 2x/wk x 6 wks , Modalities prn  Medical Diagnosis: M79.643 (ICD-10-CM) - Pain of hand, unspecified laterality  Evaluation Date: 11/6/2023  Date of Surgery: none  Insurance Authorization  Period Expiration : 11/3/23- 12/29/23     Plan of Care Expiration: 12/22/2023  Date of Return to MD: TBD      Visit # / Visits authorized: 4 / 20  Time In:7:35 am  Time Out: 8:15 am  Total Billable Time: 40 minutes     Precautions:  Standard     FOTO: 46, 50  2/3    Subjective     Pt reports: I started taking oral steroids and I feel like It has helped.  Response to previous treatment:Kortney well    Pain: 2/10 R, 0/10 left  Location: Right hand, left hand    Objective     Range of Motion:   Right Active  Index  11/6/2023 12/4/23                         MP Ext/Flex 0/50 0/51                         PIP Ext/Flex 0/85 0/88                         DIP Ext/Flex 0/50 0/50      Long 11/6/2023 12/4/23                         MP Ext/Flex 0/55 0/59                         PIP Ext/Flex 0/90 0/95                         DIP Ext/Flex 0/60 0/62      Ring 11/6/2023 12/4/23                         MP Ext/Flex 0/30 0/38                         PIP Ext/Flex 0/90 0/95                         DIP Ext/Flex 0/60 0/49        11/6/2023 12/4/23   Small                            MP Ext/Flex 0/15 0/24                         PIP Ext/Flex 0/85 0/86                         DIP Ext/Flex 0/45 0/46   THUMB                            MP Ext/Flex 0/52 NT                         IP Ext/Flex 0/44 NT                         Smalls ABD nt NT                         Radial ABD nt NT                         Opposition nt NT      Comments: Difficulty making a composite fist      finger  Wrist  ROM: Right  Active   Right Right Left     11/6/2023 12/4/23 12/4/23   Extension 30 49 64   Flexion 45 55 66   Radial Deviation 15 10 14   Ulnar Deviation 30 16 23   Supination Full  Full Full   Pronation Full  Full Full         Strength (Dyanmometer) and Pinch Strength (Pinch Gauge)  Measured in pounds and psi. Average of three trials.    11/6/2023 11/6/2023 12/4/23 12/4/23     Right  Left Right Left   Rung II 7.4 24.2 13.4 26.1   Garcia Pinch 8 12 9 11   3pt Pinch 5 9 8 7   2pt Pinch 5        9 NT NT     Farideh received the following supervised modalities after being cleared for contraindications for 10 minutes in order to promote increased blood flow and tissue elasticity:   -Patient received paraffin to BUE hand(s) for 10 minutes to increase blood flow, circulation, soften tissues and for pain management.     Farideh participated in dynamic functional therapeutic activities to improve functional performance for 34  minutes, including:  - Octi 2' bilateral upper extremities  - Red CP Sponges 1 container  - Black rubber band abduction/adduction 2' bilateral  - Yellow putty presses #3 tooling 2'    Home Exercises and Education Provided     Education provided:   - Joint protection strategies, increasing effectiveness of paraffin  - Progress towards goals     Written Home Exercises Provided: Patient instructed to cont prior HEP.  Exercises were reviewed and Farideh was able to demonstrate them prior to the end of the session.  Farideh demonstrated good  understanding of the HEP provided.   .   See EMR under Patient Instructions for exercises provided prior visit.        Assessment     Pt reports she has only been wearing splints when finger starts to feel like it is triggering. Pt instructed to capitalize on the steroid medication by wearing trigger finger splint at night to prevent pain/inflammation in the morning. Pt tolerated all ax well and reports decreased inflammation with steroid medication.     Farideh is  progressing towards her goals and there are no updates to goals at this time. Pt prognosis is Good.     Pt will continue to benefit from skilled outpatient occupational therapy to address the deficits listed in the problem list on initial evaluation provide pt/family education and to maximize pt's level of independence in the home and community environment.     Anticipated barriers to therapy: Medication-induced joint inflammation      Pt's spiritual, cultural and educational needs considered and pt agreeable to plan of care and goals.    Goals:  ST-8  weeks :  1) Patient to be IND with HEP and modalities for pain managment. Progressing  2) Patient will Increase Active Range of motion 8-10 degrees in wrist to increase functional hand use for ADLs/work/leisure activities. Progressing  3) Pt will  increase FOTO  intake score to 85 to increase their hand/wrist functional ability. Progressing  4)Pt will increase  strength 10-20 lbs. to improve functional grasp for ADLs/work/leisure activities. Progressing  5) Pt will increase pinch strength in all 3 limited positions by 1-3 psi to assist with manipulation and fine motor task. Progressing    Plan   Cont OT to address above goals.      Yeni Valerio, OTR/L, MOT  Occupational Therapist

## 2023-12-18 ENCOUNTER — PATIENT MESSAGE (OUTPATIENT)
Dept: REHABILITATION | Facility: HOSPITAL | Age: 63
End: 2023-12-18

## 2023-12-18 ENCOUNTER — CLINICAL SUPPORT (OUTPATIENT)
Dept: REHABILITATION | Facility: HOSPITAL | Age: 63
End: 2023-12-18
Payer: MEDICARE

## 2023-12-18 DIAGNOSIS — M79.642 BILATERAL HAND PAIN: Primary | ICD-10-CM

## 2023-12-18 DIAGNOSIS — M79.641 BILATERAL HAND PAIN: Primary | ICD-10-CM

## 2023-12-18 PROCEDURE — 97018 PARAFFIN BATH THERAPY: CPT | Mod: PO

## 2023-12-18 PROCEDURE — 97110 THERAPEUTIC EXERCISES: CPT | Mod: PO

## 2023-12-18 NOTE — PROGRESS NOTES
Occupational Therapy Daily Treatment Note     Date: 12/18/2023  Name: Farideh Andre  Clinic Number: [id number]    Therapy Diagnosis:   Encounter Diagnosis   Name Primary?    Bilateral hand pain Yes     Physician: Marya Ryan NP    Physician Orders: Evaluate and treat ; 2x/wk x 6 wks , Modalities prn  Medical Diagnosis: M79.643 (ICD-10-CM) - Pain of hand, unspecified laterality  Evaluation Date: 11/6/2023  Date of Surgery: none  Insurance Authorization  Period Expiration : 11/3/23- 12/29/23     Plan of Care Expiration: 12/22/2023  Date of Return to MD: TBD      Visit # / Visits authorized: 5 / 20  Time In:7:32 am  Time Out: 8:15 am  Total Billable Time: 42 minutes     Precautions:  Standard     FOTO: 46, 50  2/3    Subjective     Pt reports: Carpal tunnel is better; its just the arthritis that hurts   Response to previous treatment:Kortney well    Pain: Pain score not reported this session/10  Location: R hand digits 3-5 in joints    Objective     Range of Motion:   Right Active  Index  11/6/2023 12/4/23 12/18/23                         MP Ext/Flex 0/50 0/51 0/50                         PIP Ext/Flex 0/85 0/88 0/100                         DIP Ext/Flex 0/50 0/50 0/52      Long 11/6/2023 12/4/23 12/18/23                         MP Ext/Flex 0/55 0/59 0/66                         PIP Ext/Flex 0/90 0/95 0/100                         DIP Ext/Flex 0/60 0/62 0/63      Ring 11/6/2023 12/4/23 12/18/23                         MP Ext/Flex 0/30 0/38 0/55                         PIP Ext/Flex 0/90 0/95 0/97                         DIP Ext/Flex 0/60 0/49 0/63        11/6/2023 12/4/23 12/18/23   Small                             MP Ext/Flex 0/15 0/24 0/52                         PIP Ext/Flex 0/85 0/86 0/89                         DIP Ext/Flex 0/45 0/46 0/49   THUMB                             MP Ext/Flex 0/52 NT 0/50                         IP Ext/Flex 0/44 NT 0/50                         Smalls ABD nt NT 39                          Radial ABD nt NT 33                         Opposition nt NT SF MP      Comments: Difficulty making a composite fist      finger  Wrist ROM: Right  Active   Right Right Left Left     11/6/2023 12/4/23 12/4/23 12/18/23*   Extension 30 49 64 40   Flexion 45 55 66 30   Radial Deviation 15 10 14 20   Ulnar Deviation 30 16 23 14   Supination Full  Full Full Full   Pronation Full  Full Full Full      *Measured at 3rd MP     Strength (Dyanmometer) and Pinch Strength (Pinch Gauge)  Measured in pounds and psi. Average of three trials.    11/6/2023 11/6/2023 12/4/23 12/4/23 12/18/23     Right  Left Right Left Left   Rung II 7.4 24.2 13.4 26.1 12.1   Garcia Pinch 8 12 9 11 9   3pt Pinch 5 9 8 7 7   2pt Pinch 5        9 NT NT NT     Farideh received the following supervised modalities after being cleared for contraindications for 10 minutes in order to promote increased blood flow and tissue elasticity:   -Patient received paraffin to BUE hand(s) for 10 minutes to increase blood flow, circulation, soften tissues and for pain management.     Farideh participated in dynamic functional therapeutic activities to improve functional performance for 32 minutes, including:  - Octi 2' bilateral upper extremities  - Juxicisor 1' BUE   - Red flexbar pro/sup, WF/WE, UD/RD 15x each  - Yellow putty gripping 1' each  - Yellow putty presses with #3 tooling 1' each   - Measurements updated  - HEP update and review     Home Exercises and Education Provided     Education provided:   - Joint protection strategies, increasing effectiveness of paraffin  - Progress towards goals     Written Home Exercises Provided: Patient instructed to cont prior HEP.  Exercises were reviewed and Farideh was able to demonstrate them prior to the end of the session.  Farideh demonstrated good  understanding of the HEP provided.   .   See EMR under Patient Instructions for exercises provided prior visit.        Assessment     Pt reported pain has returned as  prior after completing course of steroid medication. Pt with improved functional fist for ax which pt expresses satisfaction with. Pt reports no longer having deficits from carpal tunnel issues but from OA, especially at digits 3-5. Pt also with c/o of trigger finger with inconsistent brace use. Pt instructed to wear nightly. Pt advised to wear arthritis compression gloves to bed to provide joint relief. Upgraded HEP and reviewed joint protection strategies,  strengthening/times to avoid use of putty with OA flare ups, and use of sponge to work out stiffness in mornings. Pt with decreased ROM and  in measurements this session 2/2 OA pain. Pt has reached max benefit through skilled OT and is safe to d/c with HEP. Pt verbalized agreement with this decision.    Anticipated barriers to therapy: Medication-induced joint inflammation    Pt's spiritual, cultural and educational needs considered and pt agreeable to plan of care and goals.    Goals:  ST-8  weeks :  1) Patient to be IND with HEP and modalities for pain managment. met 2023  2) Patient will Increase Active Range of motion 8-10 degrees in wrist to increase functional hand use for ADLs/work/leisure activities. Met 23  3) Pt will  increase FOTO  intake score to 85 to increase their hand/wrist functional ability. Not Met 23  4)Pt will increase  strength 10-20 lbs. to improve functional grasp for ADLs/work/leisure activities. Met 23  5) Pt will increase pinch strength in all 3 limited positions by 1-3 psi to assist with manipulation and fine motor task. Met 23    Plan   D/C from skilled OT services.     Yeni Valerio, OTR/L, MOT  Occupational Therapist

## 2023-12-18 NOTE — PATIENT INSTRUCTIONS
Strengthening (Resistive Putty)        Squeeze putty using thumb and all fingers.  Squeeze putty for 1 minute. Perform 2x per session. Perform 3 sessions per day. DO NOT perform when arthritis is exacerbated.     ALTERNATIVE: When arthritis is acting up, gently squeeze sponge for 1 minute or until stiffness is worked out. This may be good to perform in the morning when waking up.      Palmar Pinch Strengthening (Resistive Putty)        Pinch putty between thumb and pointer/middle fingertip.  Repeat 2-3 logs. Do 2-3 sessions per day.       Lateral Pinch Strengthening (Resistive Putty)        Squeeze between thumb and side of index finger.  Repeat 2-3 logs. Do 2-3 sessions per day.    Wear arthritis gloves (should provide a degree of compression that is comforting to the joints)      Prevention is KEY:  - Unfortunately , Arthritis is progressive and there is NO cure. However, we can preserve what little cartilage you may left  for a little longer with these small tips.    -Protect your small joints by:  - avoid sustained pinching and gripping,  - avoid sustain pinching with pulling/pushing  - avoid sleeping on your hands  - pain-free exercises and pain-free motions  - avoid repetitive motion    objects with the thumb, index and middle fingers (3 point pinch)   Open jar lids with non-slip, rubber  pads   Open packages with scissors versus pulling apart the packaging      AVOID:   Avoid pain provoking activities requiring a sustained pinch, or pinching while  pushing/pulling with the thumb     Avoid sleeping against the hands as the thumbs are compressed against the palm in. This serves as a deforming force. Braces worn at night can prevent this problem.     Avoid excessive use of scissors and/or other fine motor tasks which require notable  repetitive use of the thumb such as rome or knitting.     Limit repetitive movement patterns against resistance  such as removing jar lids, bottle caps     Joint  Protection Program for Arthritis     Joint Protection (Wringing)    Avoid wringing towels by twisting.  Solution: Loop towel around sink faucet as if braiding and pull gently, or let drip-dry.    Joint Protection (Use Large Joints)    Avoid placing pressure on fingertips.  Solution: Transfer work to other parts of body which are not affected or which have greater strength. Using body weight to push heavy doors open is an example.    Joint Protection (Ulnar Deviation)    Avoid positions that cause fingers to lean sideways toward little finger.  Solution: Use devices like jar-openers to assist in activities.    Joint Protection (Pinch)    Avoid tight pinch, such as when holding a pen.  Solution: Use a thick pen with a felt tip to reduce pressure on fingers.    Joint Protection (Lifting)    Avoid picking up heavy items with one hand.  Solution: Use both hands, and slide item whenever possible.     Joint Protection ()    Avoid: grasping thin utensils for prolonged periods.  Solution: Hold thick-handled tools in dagger fashion when-ever possible for performing tasks such as stirring or scrub-walt. Relax fingers every 10 minutes during activity.    Joint Protection (Carrying)    Avoid carrying items with weight on fingers.  Solution: Use a shoulder bag or a back pack.  Copyright © [employer]. All rights reserved.

## 2024-01-08 ENCOUNTER — TELEPHONE (OUTPATIENT)
Dept: OPHTHALMOLOGY | Facility: CLINIC | Age: 64
End: 2024-01-08
Payer: MEDICARE

## 2024-01-08 NOTE — TELEPHONE ENCOUNTER
----- Message from Shirley Dunlap sent at 1/8/2024  2:04 PM CST -----  Regarding: Pt called to schedule a new pt appt for fuch's dystrophy and pt is being referred by Optometrist Suzy Diaz  Appointment Access Request:    Pt called to schedule a new pt appt for fuch's dystrophy and pt is being referred by Optometrist Suzy Diaz    Pt can be reached at [phone]

## 2024-03-25 NOTE — PROGRESS NOTES
Subjective:       Patient ID: Farideh Andre is a 57 y.o. female.    Chief Complaint: Hypertension (3 week f/u)    Pt presents for BP repeat. Feels well, no complaints      Hypertension   Pertinent negatives include no chest pain, headaches, palpitations or shortness of breath.     Review of Systems   Constitutional: Negative.    Eyes: Negative.    Respiratory: Negative for cough, chest tightness and shortness of breath.    Cardiovascular: Negative for chest pain, palpitations and leg swelling.   Gastrointestinal: Negative.    Musculoskeletal: Negative.  Negative for joint swelling.   Skin: Negative.    Neurological: Negative for dizziness, weakness, light-headedness and headaches.       Objective:      Physical Exam   Constitutional: She is oriented to person, place, and time. She appears well-developed and well-nourished.   HENT:   Head: Normocephalic and atraumatic.   Eyes: Conjunctivae and EOM are normal. Pupils are equal, round, and reactive to light.   Neck: Normal range of motion. Neck supple. No thyromegaly present.   Cardiovascular: Normal rate, regular rhythm, normal heart sounds and intact distal pulses.    No murmur heard.  Pulmonary/Chest: Effort normal and breath sounds normal. No respiratory distress. She has no wheezes. She has no rales. She exhibits no tenderness.   Musculoskeletal: Normal range of motion. She exhibits no edema.   Lymphadenopathy:     She has no cervical adenopathy.   Neurological: She is alert and oriented to person, place, and time.   Skin: Skin is warm. No erythema.   Psychiatric: She has a normal mood and affect. Her behavior is normal. Judgment and thought content normal.       Assessment:       HTN  Plan:       Controlled on meds  RTC q6 mos    
Hpi Title: Evaluation of a Skin Lesion
How Severe Are Your Spot(S)?: moderate
Year Removed: 2016

## 2024-03-27 ENCOUNTER — OFFICE VISIT (OUTPATIENT)
Dept: OPHTHALMOLOGY | Facility: CLINIC | Age: 64
End: 2024-03-27
Attending: OPHTHALMOLOGY
Payer: MEDICARE

## 2024-03-27 DIAGNOSIS — H26.9 INCIPIENT CATARACT: Primary | ICD-10-CM

## 2024-03-27 PROCEDURE — 92004 COMPRE OPH EXAM NEW PT 1/>: CPT | Mod: S$PBB,,, | Performed by: OPHTHALMOLOGY

## 2024-03-27 PROCEDURE — 99211 OFF/OP EST MAY X REQ PHY/QHP: CPT | Mod: PBBFAC | Performed by: OPHTHALMOLOGY

## 2024-03-27 PROCEDURE — 99999 PR PBB SHADOW E&M-EST. PATIENT-LVL I: CPT | Mod: PBBFAC,,, | Performed by: OPHTHALMOLOGY

## 2024-03-27 NOTE — PROGRESS NOTES
HPI    Poor vision even with glasses  Barely passed drivers vision test  Last edited by Tommy Blanton MD on 3/27/2024  2:51 PM.            Assessment /Plan     For exam results, see Encounter Report.    Incipient cataract      Incipient cataract: Patient does reports mild visual decline, but not sufficient to affect activities of daily living. I recommend monitoring visual status and follow up when visual symptoms worsen.    Min astigmatism on марина    Recheck 6-12mos

## 2024-06-02 DIAGNOSIS — E78.5 DYSLIPIDEMIA: ICD-10-CM

## 2024-06-02 NOTE — TELEPHONE ENCOUNTER
Care Due:                  Date            Visit Type   Department     Provider  --------------------------------------------------------------------------------                                EP -                              PRIMARY      [facility] PRIMARY  Last Visit: 09-      CARE ([facility])   CARE           Breonna Multani  Next Visit: None Scheduled  None         None Found                                                            Last  Test          Frequency    Reason                     Performed    Due Date  --------------------------------------------------------------------------------    CMP.........  12 months..  rosuvastatin.............  04- 03-    Lipid Panel.  12 months..  rosuvastatin.............  05- 03-    [employer] Embedded Care Due Messages. Reference number: [id number].   6/02/2024 5:44:41 AM CDT

## 2024-06-02 NOTE — TELEPHONE ENCOUNTER
Refill Routing Note   Medication(s) are not appropriate for processing by [facility] for the following reason(s):        Required labs outdated    [facility] action(s):        Requires labs : Yes             Appointments  past 12m or future 3m with PCP    Date Provider   Last Visit   9/19/2023 Breonna Multani MD   Next Visit   Visit date not found Breonna Multani MD   ED visits in past 90 days: 0        Note composed:4:49 PM 06/02/2024

## 2024-06-03 RX ORDER — ROSUVASTATIN CALCIUM 40 MG/1
TABLET, COATED ORAL
Qty: 90 TABLET | Refills: 0 | Status: SHIPPED | OUTPATIENT
Start: 2024-06-03

## 2024-09-25 DIAGNOSIS — Z00.00 ENCOUNTER FOR MEDICARE ANNUAL WELLNESS EXAM: ICD-10-CM

## 2024-11-13 ENCOUNTER — PATIENT MESSAGE (OUTPATIENT)
Dept: ADMINISTRATIVE | Facility: HOSPITAL | Age: 64
End: 2024-11-13
Payer: MEDICARE

## 2025-03-31 ENCOUNTER — PATIENT MESSAGE (OUTPATIENT)
Dept: ADMINISTRATIVE | Facility: HOSPITAL | Age: 65
End: 2025-03-31
Payer: MEDICARE

## 2025-04-08 DIAGNOSIS — I10 HYPERTENSION, UNSPECIFIED TYPE: ICD-10-CM

## 2025-04-08 RX ORDER — METOPROLOL SUCCINATE 25 MG/1
25 TABLET, EXTENDED RELEASE ORAL
Qty: 90 TABLET | Refills: 0 | Status: SHIPPED | OUTPATIENT
Start: 2025-04-08

## 2025-04-08 NOTE — TELEPHONE ENCOUNTER
Refill Encounter    PCP Visits: Recent Visits  No visits were found meeting these conditions.  Showing recent visits within past 360 days and meeting all other requirements  Future Appointments  No visits were found meeting these conditions.  Showing future appointments within next 720 days and meeting all other requirements      Last 3 Blood Pressure:   BP Readings from Last 3 Encounters:   10/04/23 (!) 123/59   09/19/23 134/84   07/10/23 111/67     Preferred Pharmacy:   [id number] - [place], [place] - [address] [place] [address] [address]  [facility] [address]  Phone: [phone] Fax: [phone]    [id number] - [place], [place] - [address] [facility]  [address] [facility] [address]  Phone: [phone] Fax: [phone]    [facility] [place] [facility] - [address]  [address] [facility]  [place] [address]  Phone: [phone] Fax: [phone]    [id number] [address] AT [address] [address]  Phone: [phone] Fax: [phone]    [id number] [place] - [address] [address] [address]  Phone: [phone] Fax: [phone]    Requested RX:  Requested Prescriptions     Pending Prescriptions Disp Refills    metoprolol succinate (TOPROL-XL) 25 MG 24 hr tablet [Pharmacy Med Name: METOPROLOL ER SUCCINATE 25MG TABS] 90 tablet 3     Sig: TAKE 1 TABLET(25 MG) BY MOUTH EVERY DAY      RX Route: Normal

## 2025-04-08 NOTE — TELEPHONE ENCOUNTER
Care Due:                  Date            Visit Type   Department     Provider  --------------------------------------------------------------------------------                                EP -                              PRIMARY      [facility] PRIMARY  Last Visit: 09-      CARE ([facility])   CARE           Breonna Multani  Next Visit: None Scheduled  None         None Found                                                            Last  Test          Frequency    Reason                     Performed    Due Date  --------------------------------------------------------------------------------    Office Visit  15 months..  rosuvastatin.............  09- 12-    CMP.........  12 months..  rosuvastatin.............  Not Found    Overdue    Lipid Panel.  12 months..  rosuvastatin.............  04- 04-    [facility] Embedded Care Due Messages. Reference number: [id number].   4/08/2025 5:42:10 AM CDT

## 2025-07-09 DIAGNOSIS — I10 HYPERTENSION, UNSPECIFIED TYPE: ICD-10-CM

## 2025-07-09 RX ORDER — METOPROLOL SUCCINATE 25 MG/1
25 TABLET, EXTENDED RELEASE ORAL
Qty: 90 TABLET | Refills: 0 | Status: SHIPPED | OUTPATIENT
Start: 2025-07-09

## 2025-07-09 NOTE — TELEPHONE ENCOUNTER
Refill Encounter    PCP Visits: Recent Visits  No visits were found meeting these conditions.  Showing recent visits within past 360 days and meeting all other requirements  Future Appointments  No visits were found meeting these conditions.  Showing future appointments within next 720 days and meeting all other requirements      Last 3 Blood Pressure:   BP Readings from Last 3 Encounters:   10/04/23 (!) 123/59   09/19/23 134/84   07/10/23 111/67     Preferred Pharmacy:   [id number] - [place], [place] - [address] [place] [address] [address]  [facility] [address]  Phone: [phone] Fax: [phone]    [id number] - [place], [place] - [address] [facility]  [address] [facility] [address]  Phone: [phone] Fax: [phone]    [facility] [place] - [address]  [address] [facility]  [place] [address]  Phone: [phone] Fax: [phone]    [id number] [address] AT [address] [address]  Phone: [phone] Fax: [phone]    [id number] [place] - [address] [address] [address]  Phone: [phone] Fax: [phone]    Requested RX:  Requested Prescriptions     Pending Prescriptions Disp Refills    metoprolol succinate (TOPROL-XL) 25 MG 24 hr tablet [Pharmacy Med Name: METOPROLOL ER SUCCINATE 25MG TABS] 90 tablet 0     Sig: TAKE 1 TABLET(25 MG) BY MOUTH EVERY DAY      RX Route: Normal

## 2025-07-09 NOTE — TELEPHONE ENCOUNTER
Care Due:                  Date            Visit Type   Department     Provider  --------------------------------------------------------------------------------                                EP -                              PRIMARY      [facility] PRIMARY  Last Visit: 09-      CARE ([facility])   CARE           Breonna Multani  Next Visit: None Scheduled  None         None Found                                                            Last  Test          Frequency    Reason                     Performed    Due Date  --------------------------------------------------------------------------------    Office Visit  15 months..  metoprolol, rosuvastatin.  09- 12-    CMP.........  12 months..  rosuvastatin.............  Not Found    Overdue    Lipid Panel.  12 months..  rosuvastatin.............  04- 07-    [facility] Embedded Care Due Messages. Reference number: [id number].   7/09/2025 5:42:37 AM CDT